# Patient Record
Sex: FEMALE | Race: WHITE | NOT HISPANIC OR LATINO
[De-identification: names, ages, dates, MRNs, and addresses within clinical notes are randomized per-mention and may not be internally consistent; named-entity substitution may affect disease eponyms.]

---

## 2017-01-04 ENCOUNTER — TRANSCRIPTION ENCOUNTER (OUTPATIENT)
Age: 57
End: 2017-01-04

## 2017-01-27 ENCOUNTER — OUTPATIENT (OUTPATIENT)
Dept: OUTPATIENT SERVICES | Facility: HOSPITAL | Age: 57
LOS: 1 days | Discharge: HOME | End: 2017-01-27
Payer: COMMERCIAL

## 2017-01-27 DIAGNOSIS — Z80.0 FAMILY HISTORY OF MALIGNANT NEOPLASM OF DIGESTIVE ORGANS: ICD-10-CM

## 2017-01-27 PROCEDURE — 93010 ELECTROCARDIOGRAM REPORT: CPT

## 2017-02-02 ENCOUNTER — OUTPATIENT (OUTPATIENT)
Dept: OUTPATIENT SERVICES | Facility: HOSPITAL | Age: 57
LOS: 1 days | Discharge: HOME | End: 2017-02-02

## 2017-02-02 VITALS
TEMPERATURE: 97 F | OXYGEN SATURATION: 100 % | HEART RATE: 57 BPM | HEIGHT: 66 IN | SYSTOLIC BLOOD PRESSURE: 101 MMHG | RESPIRATION RATE: 15 BRPM | WEIGHT: 127.43 LBS | DIASTOLIC BLOOD PRESSURE: 64 MMHG

## 2017-02-02 DIAGNOSIS — S62.309B UNSPECIFIED FRACTURE OF UNSPECIFIED METACARPAL BONE, INITIAL ENCOUNTER FOR OPEN FRACTURE: Chronic | ICD-10-CM

## 2017-02-02 DIAGNOSIS — Z78.9 OTHER SPECIFIED HEALTH STATUS: Chronic | ICD-10-CM

## 2017-02-02 DIAGNOSIS — Z98.890 OTHER SPECIFIED POSTPROCEDURAL STATES: Chronic | ICD-10-CM

## 2017-02-02 RX ORDER — SODIUM CHLORIDE 9 MG/ML
1000 INJECTION INTRAMUSCULAR; INTRAVENOUS; SUBCUTANEOUS
Qty: 0 | Refills: 0 | Status: DISCONTINUED | OUTPATIENT
Start: 2017-02-02 | End: 2017-02-17

## 2017-02-06 DIAGNOSIS — Z80.0 FAMILY HISTORY OF MALIGNANT NEOPLASM OF DIGESTIVE ORGANS: ICD-10-CM

## 2017-02-06 DIAGNOSIS — Z12.11 ENCOUNTER FOR SCREENING FOR MALIGNANT NEOPLASM OF COLON: ICD-10-CM

## 2017-02-06 DIAGNOSIS — K57.30 DIVERTICULOSIS OF LARGE INTESTINE WITHOUT PERFORATION OR ABSCESS WITHOUT BLEEDING: ICD-10-CM

## 2018-06-18 ENCOUNTER — MESSAGE (OUTPATIENT)
Age: 58
End: 2018-06-18

## 2018-06-25 ENCOUNTER — APPOINTMENT (OUTPATIENT)
Dept: ULTRASOUND IMAGING | Facility: CLINIC | Age: 58
End: 2018-06-25
Payer: COMMERCIAL

## 2018-06-25 ENCOUNTER — APPOINTMENT (OUTPATIENT)
Dept: MAMMOGRAPHY | Facility: CLINIC | Age: 58
End: 2018-06-25
Payer: COMMERCIAL

## 2018-06-25 ENCOUNTER — OUTPATIENT (OUTPATIENT)
Dept: OUTPATIENT SERVICES | Facility: HOSPITAL | Age: 58
LOS: 1 days | End: 2018-06-25
Payer: COMMERCIAL

## 2018-06-25 DIAGNOSIS — Z00.8 ENCOUNTER FOR OTHER GENERAL EXAMINATION: ICD-10-CM

## 2018-06-25 DIAGNOSIS — Z98.890 OTHER SPECIFIED POSTPROCEDURAL STATES: Chronic | ICD-10-CM

## 2018-06-25 DIAGNOSIS — S62.309B UNSPECIFIED FRACTURE OF UNSPECIFIED METACARPAL BONE, INITIAL ENCOUNTER FOR OPEN FRACTURE: Chronic | ICD-10-CM

## 2018-06-25 DIAGNOSIS — Z78.9 OTHER SPECIFIED HEALTH STATUS: Chronic | ICD-10-CM

## 2018-06-25 PROCEDURE — 77063 BREAST TOMOSYNTHESIS BI: CPT | Mod: 26

## 2018-06-25 PROCEDURE — 76641 ULTRASOUND BREAST COMPLETE: CPT | Mod: 26,50

## 2018-06-25 PROCEDURE — 77063 BREAST TOMOSYNTHESIS BI: CPT

## 2018-06-25 PROCEDURE — 77067 SCR MAMMO BI INCL CAD: CPT

## 2018-06-25 PROCEDURE — 77067 SCR MAMMO BI INCL CAD: CPT | Mod: 26

## 2018-06-25 PROCEDURE — 76641 ULTRASOUND BREAST COMPLETE: CPT

## 2018-11-06 ENCOUNTER — TRANSCRIPTION ENCOUNTER (OUTPATIENT)
Age: 58
End: 2018-11-06

## 2019-03-28 ENCOUNTER — OUTPATIENT (OUTPATIENT)
Dept: OUTPATIENT SERVICES | Facility: HOSPITAL | Age: 59
LOS: 1 days | Discharge: HOME | End: 2019-03-28

## 2019-03-28 DIAGNOSIS — R19.7 DIARRHEA, UNSPECIFIED: ICD-10-CM

## 2019-03-28 DIAGNOSIS — S62.309B UNSPECIFIED FRACTURE OF UNSPECIFIED METACARPAL BONE, INITIAL ENCOUNTER FOR OPEN FRACTURE: Chronic | ICD-10-CM

## 2019-03-28 DIAGNOSIS — Z78.9 OTHER SPECIFIED HEALTH STATUS: Chronic | ICD-10-CM

## 2019-03-28 DIAGNOSIS — Z98.890 OTHER SPECIFIED POSTPROCEDURAL STATES: Chronic | ICD-10-CM

## 2019-03-28 LAB
ALBUMIN SERPL ELPH-MCNC: 2.7 G/DL — LOW (ref 3.3–5)
ALP SERPL-CCNC: 59 U/L — SIGNIFICANT CHANGE UP (ref 40–120)
ALT FLD-CCNC: 15 U/L — SIGNIFICANT CHANGE UP (ref 12–78)
ANION GAP SERPL CALC-SCNC: 7 MMOL/L — SIGNIFICANT CHANGE UP (ref 5–17)
AST SERPL-CCNC: 12 U/L — LOW (ref 15–37)
BASOPHILS # BLD AUTO: 0.02 K/UL — SIGNIFICANT CHANGE UP (ref 0–0.2)
BASOPHILS NFR BLD AUTO: 0.6 % — SIGNIFICANT CHANGE UP (ref 0–2)
BILIRUB SERPL-MCNC: 0.4 MG/DL — SIGNIFICANT CHANGE UP (ref 0.2–1.2)
BUN SERPL-MCNC: 7 MG/DL — SIGNIFICANT CHANGE UP (ref 7–23)
CALCIUM SERPL-MCNC: 7.9 MG/DL — LOW (ref 8.5–10.1)
CHLORIDE SERPL-SCNC: 110 MMOL/L — HIGH (ref 96–108)
CO2 SERPL-SCNC: 25 MMOL/L — SIGNIFICANT CHANGE UP (ref 22–31)
CREAT SERPL-MCNC: 0.62 MG/DL — SIGNIFICANT CHANGE UP (ref 0.5–1.3)
EOSINOPHIL # BLD AUTO: 0.09 K/UL — SIGNIFICANT CHANGE UP (ref 0–0.5)
EOSINOPHIL NFR BLD AUTO: 2.5 % — SIGNIFICANT CHANGE UP (ref 0–6)
GLUCOSE SERPL-MCNC: 103 MG/DL — HIGH (ref 70–99)
HCT VFR BLD CALC: 38.4 % — SIGNIFICANT CHANGE UP (ref 34.5–45)
HGB BLD-MCNC: 12.9 G/DL — SIGNIFICANT CHANGE UP (ref 11.5–15.5)
IMM GRANULOCYTES NFR BLD AUTO: 0.3 % — SIGNIFICANT CHANGE UP (ref 0–1.5)
LYMPHOCYTES # BLD AUTO: 1.54 K/UL — SIGNIFICANT CHANGE UP (ref 1–3.3)
LYMPHOCYTES # BLD AUTO: 43.6 % — SIGNIFICANT CHANGE UP (ref 13–44)
MCHC RBC-ENTMCNC: 30.4 PG — SIGNIFICANT CHANGE UP (ref 27–34)
MCHC RBC-ENTMCNC: 33.6 GM/DL — SIGNIFICANT CHANGE UP (ref 32–36)
MCV RBC AUTO: 90.6 FL — SIGNIFICANT CHANGE UP (ref 80–100)
MONOCYTES # BLD AUTO: 0.56 K/UL — SIGNIFICANT CHANGE UP (ref 0–0.9)
MONOCYTES NFR BLD AUTO: 15.9 % — HIGH (ref 2–14)
NEUTROPHILS # BLD AUTO: 1.31 K/UL — LOW (ref 1.8–7.4)
NEUTROPHILS NFR BLD AUTO: 37.1 % — LOW (ref 43–77)
NRBC # BLD: 0 /100 WBCS — SIGNIFICANT CHANGE UP (ref 0–0)
PLATELET # BLD AUTO: 223 K/UL — SIGNIFICANT CHANGE UP (ref 150–400)
POTASSIUM SERPL-MCNC: 3.6 MMOL/L — SIGNIFICANT CHANGE UP (ref 3.5–5.3)
POTASSIUM SERPL-SCNC: 3.6 MMOL/L — SIGNIFICANT CHANGE UP (ref 3.5–5.3)
PROT SERPL-MCNC: 6.1 GM/DL — SIGNIFICANT CHANGE UP (ref 6–8.3)
RBC # BLD: 4.24 M/UL — SIGNIFICANT CHANGE UP (ref 3.8–5.2)
RBC # FLD: 12.8 % — SIGNIFICANT CHANGE UP (ref 10.3–14.5)
SODIUM SERPL-SCNC: 142 MMOL/L — SIGNIFICANT CHANGE UP (ref 135–145)
WBC # BLD: 3.53 K/UL — LOW (ref 3.8–10.5)
WBC # FLD AUTO: 3.53 K/UL — LOW (ref 3.8–10.5)

## 2019-03-29 PROBLEM — R76.11 NONSPECIFIC REACTION TO TUBERCULIN SKIN TEST WITHOUT ACTIVE TUBERCULOSIS: Chronic | Status: ACTIVE | Noted: 2017-02-02

## 2019-03-29 PROBLEM — N95.1 MENOPAUSAL AND FEMALE CLIMACTERIC STATES: Chronic | Status: ACTIVE | Noted: 2017-02-02

## 2019-03-29 PROBLEM — K63.5 POLYP OF COLON: Chronic | Status: ACTIVE | Noted: 2017-02-02

## 2019-03-29 LAB
CULTURE RESULTS: SIGNIFICANT CHANGE UP
SPECIMEN SOURCE: SIGNIFICANT CHANGE UP

## 2019-06-12 ENCOUNTER — RESULT REVIEW (OUTPATIENT)
Age: 59
End: 2019-06-12

## 2019-06-25 ENCOUNTER — OUTPATIENT (OUTPATIENT)
Dept: OUTPATIENT SERVICES | Facility: HOSPITAL | Age: 59
LOS: 1 days | End: 2019-06-25
Payer: COMMERCIAL

## 2019-06-25 ENCOUNTER — APPOINTMENT (OUTPATIENT)
Dept: MAMMOGRAPHY | Facility: CLINIC | Age: 59
End: 2019-06-25
Payer: COMMERCIAL

## 2019-06-25 DIAGNOSIS — Z98.890 OTHER SPECIFIED POSTPROCEDURAL STATES: Chronic | ICD-10-CM

## 2019-06-25 DIAGNOSIS — Z78.9 OTHER SPECIFIED HEALTH STATUS: Chronic | ICD-10-CM

## 2019-06-25 DIAGNOSIS — Z00.8 ENCOUNTER FOR OTHER GENERAL EXAMINATION: ICD-10-CM

## 2019-06-25 DIAGNOSIS — S62.309B UNSPECIFIED FRACTURE OF UNSPECIFIED METACARPAL BONE, INITIAL ENCOUNTER FOR OPEN FRACTURE: Chronic | ICD-10-CM

## 2019-06-25 PROCEDURE — 77067 SCR MAMMO BI INCL CAD: CPT

## 2019-06-25 PROCEDURE — 77063 BREAST TOMOSYNTHESIS BI: CPT

## 2019-06-25 PROCEDURE — 77063 BREAST TOMOSYNTHESIS BI: CPT | Mod: 26

## 2019-06-25 PROCEDURE — 77067 SCR MAMMO BI INCL CAD: CPT | Mod: 26

## 2020-04-25 ENCOUNTER — MESSAGE (OUTPATIENT)
Age: 60
End: 2020-04-25

## 2020-05-05 LAB
SARS-COV-2 IGG SERPL IA-ACNC: <0.1 INDEX
SARS-COV-2 IGG SERPL QL IA: NEGATIVE

## 2020-07-15 ENCOUNTER — RESULT REVIEW (OUTPATIENT)
Age: 60
End: 2020-07-15

## 2020-10-08 ENCOUNTER — OUTPATIENT (OUTPATIENT)
Dept: OUTPATIENT SERVICES | Facility: HOSPITAL | Age: 60
LOS: 1 days | End: 2020-10-08
Payer: COMMERCIAL

## 2020-10-08 ENCOUNTER — APPOINTMENT (OUTPATIENT)
Dept: MAMMOGRAPHY | Facility: CLINIC | Age: 60
End: 2020-10-08
Payer: COMMERCIAL

## 2020-10-08 DIAGNOSIS — S62.309B UNSPECIFIED FRACTURE OF UNSPECIFIED METACARPAL BONE, INITIAL ENCOUNTER FOR OPEN FRACTURE: Chronic | ICD-10-CM

## 2020-10-08 DIAGNOSIS — Z98.890 OTHER SPECIFIED POSTPROCEDURAL STATES: Chronic | ICD-10-CM

## 2020-10-08 DIAGNOSIS — Z78.9 OTHER SPECIFIED HEALTH STATUS: Chronic | ICD-10-CM

## 2020-10-08 DIAGNOSIS — Z00.8 ENCOUNTER FOR OTHER GENERAL EXAMINATION: ICD-10-CM

## 2020-10-08 PROCEDURE — 77063 BREAST TOMOSYNTHESIS BI: CPT | Mod: 26

## 2020-10-08 PROCEDURE — 77067 SCR MAMMO BI INCL CAD: CPT | Mod: 26

## 2020-10-08 PROCEDURE — 77063 BREAST TOMOSYNTHESIS BI: CPT

## 2020-10-08 PROCEDURE — 77067 SCR MAMMO BI INCL CAD: CPT

## 2020-11-01 ENCOUNTER — EMERGENCY (EMERGENCY)
Facility: HOSPITAL | Age: 60
LOS: 0 days | Discharge: HOME | End: 2020-11-01
Payer: COMMERCIAL

## 2020-11-01 VITALS
SYSTOLIC BLOOD PRESSURE: 135 MMHG | OXYGEN SATURATION: 99 % | RESPIRATION RATE: 16 BRPM | HEIGHT: 66 IN | DIASTOLIC BLOOD PRESSURE: 81 MMHG | TEMPERATURE: 98 F | HEART RATE: 81 BPM

## 2020-11-01 DIAGNOSIS — Z98.890 OTHER SPECIFIED POSTPROCEDURAL STATES: Chronic | ICD-10-CM

## 2020-11-01 DIAGNOSIS — Z78.9 OTHER SPECIFIED HEALTH STATUS: Chronic | ICD-10-CM

## 2020-11-01 DIAGNOSIS — Z20.828 CONTACT WITH AND (SUSPECTED) EXPOSURE TO OTHER VIRAL COMMUNICABLE DISEASES: ICD-10-CM

## 2020-11-01 DIAGNOSIS — S62.309B UNSPECIFIED FRACTURE OF UNSPECIFIED METACARPAL BONE, INITIAL ENCOUNTER FOR OPEN FRACTURE: Chronic | ICD-10-CM

## 2020-11-01 PROCEDURE — 99283 EMERGENCY DEPT VISIT LOW MDM: CPT

## 2020-11-01 PROCEDURE — U0003: CPT

## 2020-11-01 NOTE — ED STATDOCS - OBJECTIVE STATEMENT
59 y/o Female with no PMHX presents to the ED with concern for Covid 19 after travel to [place].  Pt denies symptoms including cough, fever, SOB, sore throat, runny nose, bodyaches, nausea, diarrhea, loss of smell. Pt here for testing.

## 2020-11-01 NOTE — ED STATDOCS - PATIENT PORTAL LINK FT
You can access the FollowMyHealth Patient Portal offered by [facility] by registering at the following website: [email or web address]. By joining [employer]’s FollowMyHealth portal, you will also be able to view your health information using other applications (apps) compatible with our system.

## 2020-11-02 LAB — SARS-COV-2 RNA SPEC QL NAA+PROBE: SIGNIFICANT CHANGE UP

## 2020-11-09 ENCOUNTER — TRANSCRIPTION ENCOUNTER (OUTPATIENT)
Age: 60
End: 2020-11-09

## 2020-11-09 ENCOUNTER — APPOINTMENT (OUTPATIENT)
Dept: OPHTHALMOLOGY | Facility: CLINIC | Age: 60
End: 2020-11-09
Payer: COMMERCIAL

## 2020-11-09 ENCOUNTER — NON-APPOINTMENT (OUTPATIENT)
Age: 60
End: 2020-11-09

## 2020-11-09 PROCEDURE — 92004 COMPRE OPH EXAM NEW PT 1/>: CPT

## 2020-11-09 PROCEDURE — 99072 ADDL SUPL MATRL&STAF TM PHE: CPT

## 2020-11-10 ENCOUNTER — APPOINTMENT (OUTPATIENT)
Dept: OPHTHALMOLOGY | Facility: CLINIC | Age: 60
End: 2020-11-10
Payer: COMMERCIAL

## 2020-11-10 ENCOUNTER — NON-APPOINTMENT (OUTPATIENT)
Age: 60
End: 2020-11-10

## 2020-11-10 PROCEDURE — 99072 ADDL SUPL MATRL&STAF TM PHE: CPT

## 2020-11-10 PROCEDURE — 92012 INTRM OPH EXAM EST PATIENT: CPT

## 2020-11-12 ENCOUNTER — APPOINTMENT (OUTPATIENT)
Dept: OPHTHALMOLOGY | Facility: CLINIC | Age: 60
End: 2020-11-12
Payer: COMMERCIAL

## 2020-11-12 ENCOUNTER — NON-APPOINTMENT (OUTPATIENT)
Age: 60
End: 2020-11-12

## 2020-11-12 PROCEDURE — 92012 INTRM OPH EXAM EST PATIENT: CPT

## 2020-11-12 PROCEDURE — 99072 ADDL SUPL MATRL&STAF TM PHE: CPT

## 2020-11-17 ENCOUNTER — APPOINTMENT (OUTPATIENT)
Dept: OPHTHALMOLOGY | Facility: CLINIC | Age: 60
End: 2020-11-17
Payer: COMMERCIAL

## 2020-11-17 ENCOUNTER — NON-APPOINTMENT (OUTPATIENT)
Age: 60
End: 2020-11-17

## 2020-11-17 PROCEDURE — 92012 INTRM OPH EXAM EST PATIENT: CPT

## 2020-11-17 PROCEDURE — 99072 ADDL SUPL MATRL&STAF TM PHE: CPT

## 2020-11-19 ENCOUNTER — OUTPATIENT (OUTPATIENT)
Dept: OUTPATIENT SERVICES | Facility: HOSPITAL | Age: 60
LOS: 1 days | End: 2020-11-19
Payer: COMMERCIAL

## 2020-11-19 DIAGNOSIS — S62.309B UNSPECIFIED FRACTURE OF UNSPECIFIED METACARPAL BONE, INITIAL ENCOUNTER FOR OPEN FRACTURE: Chronic | ICD-10-CM

## 2020-11-19 DIAGNOSIS — Z78.9 OTHER SPECIFIED HEALTH STATUS: Chronic | ICD-10-CM

## 2020-11-19 DIAGNOSIS — Z98.890 OTHER SPECIFIED POSTPROCEDURAL STATES: Chronic | ICD-10-CM

## 2020-11-19 DIAGNOSIS — Z11.59 ENCOUNTER FOR SCREENING FOR OTHER VIRAL DISEASES: ICD-10-CM

## 2020-11-19 LAB — SARS-COV-2 RNA SPEC QL NAA+PROBE: SIGNIFICANT CHANGE UP

## 2020-11-19 PROCEDURE — U0003: CPT

## 2020-11-20 DIAGNOSIS — Z11.59 ENCOUNTER FOR SCREENING FOR OTHER VIRAL DISEASES: ICD-10-CM

## 2021-01-22 DIAGNOSIS — Z01.818 ENCOUNTER FOR OTHER PREPROCEDURAL EXAMINATION: ICD-10-CM

## 2021-01-25 ENCOUNTER — APPOINTMENT (OUTPATIENT)
Dept: DISASTER EMERGENCY | Facility: CLINIC | Age: 61
End: 2021-01-25

## 2021-01-26 LAB — SARS-COV-2 N GENE NPH QL NAA+PROBE: NOT DETECTED

## 2021-01-28 ENCOUNTER — OUTPATIENT (OUTPATIENT)
Dept: OUTPATIENT SERVICES | Facility: HOSPITAL | Age: 61
LOS: 1 days | Discharge: HOME | End: 2021-01-28

## 2021-01-28 VITALS
HEART RATE: 57 BPM | TEMPERATURE: 98 F | SYSTOLIC BLOOD PRESSURE: 115 MMHG | OXYGEN SATURATION: 99 % | WEIGHT: 119.93 LBS | HEIGHT: 65 IN | RESPIRATION RATE: 23 BRPM | DIASTOLIC BLOOD PRESSURE: 55 MMHG

## 2021-01-28 DIAGNOSIS — Z78.9 OTHER SPECIFIED HEALTH STATUS: Chronic | ICD-10-CM

## 2021-01-28 DIAGNOSIS — Z86.010 PERSONAL HISTORY OF COLONIC POLYPS: ICD-10-CM

## 2021-01-28 DIAGNOSIS — S62.309B UNSPECIFIED FRACTURE OF UNSPECIFIED METACARPAL BONE, INITIAL ENCOUNTER FOR OPEN FRACTURE: Chronic | ICD-10-CM

## 2021-01-28 DIAGNOSIS — Z98.890 OTHER SPECIFIED POSTPROCEDURAL STATES: Chronic | ICD-10-CM

## 2021-01-28 RX ORDER — PROGESTERONE 200 MG/1
1 CAPSULE, LIQUID FILLED ORAL
Qty: 0 | Refills: 0 | DISCHARGE

## 2021-01-28 RX ORDER — ESTRADIOL 4 UG/1
1 INSERT VAGINAL
Qty: 0 | Refills: 0 | DISCHARGE

## 2021-02-04 DIAGNOSIS — Z12.11 ENCOUNTER FOR SCREENING FOR MALIGNANT NEOPLASM OF COLON: ICD-10-CM

## 2021-02-04 DIAGNOSIS — Z80.0 FAMILY HISTORY OF MALIGNANT NEOPLASM OF DIGESTIVE ORGANS: ICD-10-CM

## 2021-02-04 DIAGNOSIS — K64.8 OTHER HEMORRHOIDS: ICD-10-CM

## 2021-10-20 ENCOUNTER — RESULT REVIEW (OUTPATIENT)
Age: 61
End: 2021-10-20

## 2021-10-27 ENCOUNTER — NON-APPOINTMENT (OUTPATIENT)
Age: 61
End: 2021-10-27

## 2021-11-04 ENCOUNTER — RESULT REVIEW (OUTPATIENT)
Age: 61
End: 2021-11-04

## 2021-11-04 ENCOUNTER — APPOINTMENT (OUTPATIENT)
Dept: ULTRASOUND IMAGING | Facility: CLINIC | Age: 61
End: 2021-11-04

## 2021-11-04 ENCOUNTER — OUTPATIENT (OUTPATIENT)
Dept: OUTPATIENT SERVICES | Facility: HOSPITAL | Age: 61
LOS: 1 days | End: 2021-11-04
Payer: COMMERCIAL

## 2021-11-04 ENCOUNTER — APPOINTMENT (OUTPATIENT)
Dept: MAMMOGRAPHY | Facility: CLINIC | Age: 61
End: 2021-11-04
Payer: COMMERCIAL

## 2021-11-04 DIAGNOSIS — Z98.890 OTHER SPECIFIED POSTPROCEDURAL STATES: Chronic | ICD-10-CM

## 2021-11-04 DIAGNOSIS — Z00.8 ENCOUNTER FOR OTHER GENERAL EXAMINATION: ICD-10-CM

## 2021-11-04 DIAGNOSIS — S62.309B UNSPECIFIED FRACTURE OF UNSPECIFIED METACARPAL BONE, INITIAL ENCOUNTER FOR OPEN FRACTURE: Chronic | ICD-10-CM

## 2021-11-04 DIAGNOSIS — Z78.9 OTHER SPECIFIED HEALTH STATUS: Chronic | ICD-10-CM

## 2021-11-04 PROCEDURE — 77063 BREAST TOMOSYNTHESIS BI: CPT

## 2021-11-04 PROCEDURE — 76641 ULTRASOUND BREAST COMPLETE: CPT | Mod: 26,50

## 2021-11-04 PROCEDURE — 76641 ULTRASOUND BREAST COMPLETE: CPT

## 2021-11-04 PROCEDURE — 77063 BREAST TOMOSYNTHESIS BI: CPT | Mod: 26

## 2021-11-04 PROCEDURE — 77067 SCR MAMMO BI INCL CAD: CPT

## 2021-11-04 PROCEDURE — 77067 SCR MAMMO BI INCL CAD: CPT | Mod: 26

## 2021-11-05 ENCOUNTER — NON-APPOINTMENT (OUTPATIENT)
Age: 61
End: 2021-11-05

## 2021-11-22 ENCOUNTER — OUTPATIENT (OUTPATIENT)
Dept: OUTPATIENT SERVICES | Facility: HOSPITAL | Age: 61
LOS: 1 days | End: 2021-11-22
Payer: COMMERCIAL

## 2021-11-22 DIAGNOSIS — Z98.890 OTHER SPECIFIED POSTPROCEDURAL STATES: Chronic | ICD-10-CM

## 2021-11-22 DIAGNOSIS — S62.309B UNSPECIFIED FRACTURE OF UNSPECIFIED METACARPAL BONE, INITIAL ENCOUNTER FOR OPEN FRACTURE: Chronic | ICD-10-CM

## 2021-11-22 DIAGNOSIS — Z20.828 CONTACT WITH AND (SUSPECTED) EXPOSURE TO OTHER VIRAL COMMUNICABLE DISEASES: ICD-10-CM

## 2021-11-22 DIAGNOSIS — Z78.9 OTHER SPECIFIED HEALTH STATUS: Chronic | ICD-10-CM

## 2021-11-22 LAB — SARS-COV-2 RNA SPEC QL NAA+PROBE: SIGNIFICANT CHANGE UP

## 2021-11-22 PROCEDURE — U0005: CPT

## 2021-11-22 PROCEDURE — U0003: CPT

## 2021-11-23 DIAGNOSIS — Z20.828 CONTACT WITH AND (SUSPECTED) EXPOSURE TO OTHER VIRAL COMMUNICABLE DISEASES: ICD-10-CM

## 2022-01-02 ENCOUNTER — OUTPATIENT (OUTPATIENT)
Dept: OUTPATIENT SERVICES | Facility: HOSPITAL | Age: 62
LOS: 1 days | End: 2022-01-02
Payer: COMMERCIAL

## 2022-01-02 DIAGNOSIS — Z98.890 OTHER SPECIFIED POSTPROCEDURAL STATES: Chronic | ICD-10-CM

## 2022-01-02 DIAGNOSIS — Z78.9 OTHER SPECIFIED HEALTH STATUS: Chronic | ICD-10-CM

## 2022-01-02 DIAGNOSIS — S62.309B UNSPECIFIED FRACTURE OF UNSPECIFIED METACARPAL BONE, INITIAL ENCOUNTER FOR OPEN FRACTURE: Chronic | ICD-10-CM

## 2022-01-02 DIAGNOSIS — Z20.828 CONTACT WITH AND (SUSPECTED) EXPOSURE TO OTHER VIRAL COMMUNICABLE DISEASES: ICD-10-CM

## 2022-01-02 LAB — SARS-COV-2 RNA SPEC QL NAA+PROBE: DETECTED

## 2022-01-02 PROCEDURE — C9803: CPT

## 2022-01-02 PROCEDURE — U0005: CPT

## 2022-01-02 PROCEDURE — U0003: CPT

## 2022-01-03 DIAGNOSIS — Z20.828 CONTACT WITH AND (SUSPECTED) EXPOSURE TO OTHER VIRAL COMMUNICABLE DISEASES: ICD-10-CM

## 2022-04-04 ENCOUNTER — APPOINTMENT (OUTPATIENT)
Dept: PSYCHIATRY | Facility: CLINIC | Age: 62
End: 2022-04-04
Payer: COMMERCIAL

## 2022-04-04 PROCEDURE — 90791 PSYCH DIAGNOSTIC EVALUATION: CPT | Mod: 95

## 2022-04-11 ENCOUNTER — APPOINTMENT (OUTPATIENT)
Dept: PSYCHIATRY | Facility: CLINIC | Age: 62
End: 2022-04-11
Payer: COMMERCIAL

## 2022-04-11 PROCEDURE — 90834 PSYTX W PT 45 MINUTES: CPT | Mod: 95

## 2022-04-19 ENCOUNTER — APPOINTMENT (OUTPATIENT)
Dept: PSYCHIATRY | Facility: CLINIC | Age: 62
End: 2022-04-19
Payer: COMMERCIAL

## 2022-04-19 PROCEDURE — 90837 PSYTX W PT 60 MINUTES: CPT | Mod: 95

## 2022-04-28 ENCOUNTER — APPOINTMENT (OUTPATIENT)
Dept: PSYCHIATRY | Facility: CLINIC | Age: 62
End: 2022-04-28
Payer: COMMERCIAL

## 2022-04-28 PROCEDURE — 90837 PSYTX W PT 60 MINUTES: CPT | Mod: 95

## 2022-05-05 ENCOUNTER — APPOINTMENT (OUTPATIENT)
Dept: PSYCHIATRY | Facility: CLINIC | Age: 62
End: 2022-05-05
Payer: COMMERCIAL

## 2022-05-05 PROCEDURE — 90837 PSYTX W PT 60 MINUTES: CPT | Mod: 95

## 2022-05-12 ENCOUNTER — APPOINTMENT (OUTPATIENT)
Dept: PSYCHIATRY | Facility: CLINIC | Age: 62
End: 2022-05-12
Payer: COMMERCIAL

## 2022-05-12 PROCEDURE — 90837 PSYTX W PT 60 MINUTES: CPT | Mod: 95

## 2022-05-19 ENCOUNTER — APPOINTMENT (OUTPATIENT)
Dept: PSYCHIATRY | Facility: CLINIC | Age: 62
End: 2022-05-19
Payer: COMMERCIAL

## 2022-05-19 PROCEDURE — 90837 PSYTX W PT 60 MINUTES: CPT | Mod: 95

## 2022-05-27 ENCOUNTER — APPOINTMENT (OUTPATIENT)
Dept: PSYCHIATRY | Facility: CLINIC | Age: 62
End: 2022-05-27
Payer: COMMERCIAL

## 2022-05-27 PROCEDURE — 90834 PSYTX W PT 45 MINUTES: CPT | Mod: 95

## 2022-06-03 ENCOUNTER — APPOINTMENT (OUTPATIENT)
Dept: PSYCHIATRY | Facility: CLINIC | Age: 62
End: 2022-06-03
Payer: COMMERCIAL

## 2022-06-03 PROCEDURE — 90837 PSYTX W PT 60 MINUTES: CPT | Mod: 95

## 2022-06-09 ENCOUNTER — NON-APPOINTMENT (OUTPATIENT)
Age: 62
End: 2022-06-09

## 2022-06-09 ENCOUNTER — APPOINTMENT (OUTPATIENT)
Dept: PSYCHIATRY | Facility: CLINIC | Age: 62
End: 2022-06-09

## 2022-06-23 ENCOUNTER — APPOINTMENT (OUTPATIENT)
Dept: PSYCHIATRY | Facility: CLINIC | Age: 62
End: 2022-06-23

## 2022-06-30 ENCOUNTER — APPOINTMENT (OUTPATIENT)
Dept: PSYCHIATRY | Facility: CLINIC | Age: 62
End: 2022-06-30

## 2022-06-30 PROCEDURE — 90834 PSYTX W PT 45 MINUTES: CPT | Mod: 95

## 2022-07-07 ENCOUNTER — APPOINTMENT (OUTPATIENT)
Dept: PSYCHIATRY | Facility: CLINIC | Age: 62
End: 2022-07-07

## 2022-07-07 PROCEDURE — 90834 PSYTX W PT 45 MINUTES: CPT | Mod: 95

## 2022-07-21 ENCOUNTER — APPOINTMENT (OUTPATIENT)
Dept: PSYCHIATRY | Facility: CLINIC | Age: 62
End: 2022-07-21

## 2022-07-21 PROCEDURE — 90834 PSYTX W PT 45 MINUTES: CPT | Mod: 95

## 2022-07-29 ENCOUNTER — APPOINTMENT (OUTPATIENT)
Dept: PSYCHIATRY | Facility: CLINIC | Age: 62
End: 2022-07-29

## 2022-07-29 PROCEDURE — 90837 PSYTX W PT 60 MINUTES: CPT | Mod: 95

## 2022-08-11 ENCOUNTER — APPOINTMENT (OUTPATIENT)
Dept: PSYCHIATRY | Facility: CLINIC | Age: 62
End: 2022-08-11

## 2022-08-11 PROCEDURE — 90837 PSYTX W PT 60 MINUTES: CPT | Mod: 95

## 2022-08-25 ENCOUNTER — APPOINTMENT (OUTPATIENT)
Dept: PSYCHIATRY | Facility: CLINIC | Age: 62
End: 2022-08-25

## 2022-08-29 ENCOUNTER — APPOINTMENT (OUTPATIENT)
Dept: PSYCHIATRY | Facility: CLINIC | Age: 62
End: 2022-08-29

## 2022-08-29 PROCEDURE — 90837 PSYTX W PT 60 MINUTES: CPT | Mod: 95

## 2022-09-12 ENCOUNTER — APPOINTMENT (OUTPATIENT)
Dept: PSYCHIATRY | Facility: CLINIC | Age: 62
End: 2022-09-12

## 2022-09-12 PROCEDURE — 90834 PSYTX W PT 45 MINUTES: CPT | Mod: 95

## 2022-09-26 ENCOUNTER — APPOINTMENT (OUTPATIENT)
Dept: PSYCHIATRY | Facility: CLINIC | Age: 62
End: 2022-09-26

## 2022-09-26 PROCEDURE — 90832 PSYTX W PT 30 MINUTES: CPT | Mod: 95

## 2022-10-07 ENCOUNTER — APPOINTMENT (OUTPATIENT)
Dept: PSYCHIATRY | Facility: CLINIC | Age: 62
End: 2022-10-07

## 2022-10-07 PROCEDURE — 90834 PSYTX W PT 45 MINUTES: CPT | Mod: 95

## 2022-10-21 ENCOUNTER — APPOINTMENT (OUTPATIENT)
Dept: PSYCHIATRY | Facility: CLINIC | Age: 62
End: 2022-10-21

## 2022-10-21 PROCEDURE — 90837 PSYTX W PT 60 MINUTES: CPT | Mod: 95

## 2022-10-25 NOTE — REASON FOR VISIT
[Home] : at home, [unfilled] , at the time of the visit. [Other Location: e.g. Home (Enter Location, City,State)___] : at [unfilled] [Patient] : the patient [Self] : self [FreeTextEntry1] : follow-up

## 2022-10-25 NOTE — PHYSICAL EXAM
[Average] : average [Cooperative] : cooperative [Anxious] : anxious [Full] : full [Clear] : clear [Linear/Goal Directed] : linear/goal directed [Above average] : above average [WNL] : within normal limits

## 2022-10-25 NOTE — PLAN
[Trauma Focused CBT] : Trauma Focused CBT [Recommended Frequency of Visits: ____] : Recommended frequency of visits: [unfilled] [Return in ____ week(s)] : Return in [unfilled] week(s) [FreeTextEntry2] : 1) Demonstrate understanding of relationship between trauma history and thoughts and beliefs contributing to anxiety symptoms\par \par 2) Engage in cognitive restructuring of beliefs and emotional processing and meaning-making around history of trauma.  [de-identified] : Tami returns for a follow-up. She reported on a panic attack that she experienced after she had scheduled a work meeting with a male colleague and began to have anticipatory fears about this being the first time that they would be meeting without others present. Guided exploration about underlying scenarios she was envisioning in which he would act inappropriately, and discussed how these relate to prior experiences she had as a child and adolescent. She reported that she has been having more vivid recollections of the experience that occurred when she was 4-5, and we discussed how the anxiety she has been feeling both in the work she has been doing for her company as well as toward the colleagues she has been engaging with may be due to thematic or sensory similarities to how she felt when she was experiencing these earlier traumatic incidents. Discussed how to frame the anxiety she experiences in present-day scenarios in terms of fears she has that she will be traumatized again, and guided illustration about how to remind herself when these interactions occur that this outcome did not occur (e.g., distinguishing the past from the present). Concluded session by introducing and practicing a breath retraining exercise and reviewed rationale for daily practice for 5-10 minutes at times when she is already feeling relaxed. [FreeTextEntry1] : Continue bi-weekly individual psychotherapy sessions focused on trauma-informed CBT

## 2022-11-01 ENCOUNTER — APPOINTMENT (OUTPATIENT)
Dept: PSYCHIATRY | Facility: CLINIC | Age: 62
End: 2022-11-01

## 2022-11-01 PROCEDURE — 90834 PSYTX W PT 45 MINUTES: CPT | Mod: 95

## 2022-11-03 NOTE — PLAN
[Trauma Focused CBT] : Trauma Focused CBT [Recommended Frequency of Visits: ____] : Recommended frequency of visits: [unfilled] [Return in ____ week(s)] : Return in [unfilled] week(s) [FreeTextEntry2] : 1) Demonstrate understanding of relationship between trauma history and thoughts and beliefs contributing to anxiety symptoms\par \par 2) Engage in cognitive restructuring of beliefs and emotional processing and meaning-making around history of trauma.  [de-identified] : Tami returns for a follow-up. She reported on improvements to anxiety symptoms over the past two weeks. She has been engaging in self-monitoring of specific precipitants for moments when she feels anxious and reflected on how her process of attending specifically to catastrophizing thoughts (e.g., asking herself what feared scenario she is imagining) has facilitated both recognition of the origins of some of these fears from prior experiences as well as an enhanced ability to re-frame these thoughts. Discussed this in terms of working toward distinguishing the past from the present in her mind in ways that aim to facilitate growth after traumatic experiences. She has decided to abstain from alcohol as well, noting that she typically drinks 1-2 glasses socially but has noticed that at times this leads to increased anxiety. [FreeTextEntry1] : Continue bi-weekly individual psychotherapy sessions focused on trauma-informed CBT

## 2022-11-14 ENCOUNTER — APPOINTMENT (OUTPATIENT)
Dept: PSYCHIATRY | Facility: CLINIC | Age: 62
End: 2022-11-14

## 2022-11-14 PROCEDURE — 90837 PSYTX W PT 60 MINUTES: CPT | Mod: 95

## 2022-11-15 NOTE — PLAN
[Trauma Focused CBT] : Trauma Focused CBT [Recommended Frequency of Visits: ____] : Recommended frequency of visits: [unfilled] [Return in ____ week(s)] : Return in [unfilled] week(s) [FreeTextEntry2] : 1) Demonstrate understanding of relationship between trauma history and thoughts and beliefs contributing to anxiety symptoms\par \par 2) Engage in cognitive restructuring of beliefs and emotional processing and meaning-making around history of trauma.  [de-identified] : Tami returns for a follow-up. Began session by informing Tami that I will be leaving [facility] in January 2023 and discussing treatment planning. She has been observing benefit from the trauma-focused CBT sessions and thinks she may be in a place to wrap up treatment in the next 1-2 months but would like to continue assessing her progress over the next several sessions, at which time we will formulate a concrete plan.\par \par She reported on having noticed an increase in anxiety in the days following two meetings she had with advisors and her  for their company during which she was either interrupted by them or felt invalidated in regard to her opinions and thought processes. She attended a concert a few days later and began to experience panic symptoms and discussed how self-monitoring what may have precipitated these symptoms was helpful in drawing connections between the panic and the work interactions she had earlier in the week. We explored how the anxiety she felt may have been occurring alongside feelings of anger toward her colleague, and how it may not have felt safe to recognize the anger due to dynamics that have activated reminders of earlier traumatic experiences. She discussed how getting in touch with this feeling fostered awareness of how she could address some of the dynamics that have been frustrating her with appropriate assertiveness. Discussed how this is an example of effectively distinguishing trauma-related reminders from present-day circumstances to foster corrective emotional experiences. [FreeTextEntry1] : Continue bi-weekly individual psychotherapy sessions focused on trauma-informed CBT

## 2022-11-22 ENCOUNTER — APPOINTMENT (OUTPATIENT)
Dept: PSYCHIATRY | Facility: CLINIC | Age: 62
End: 2022-11-22

## 2022-11-22 PROCEDURE — 90837 PSYTX W PT 60 MINUTES: CPT | Mod: 95

## 2022-11-25 NOTE — PLAN
[Trauma Focused CBT] : Trauma Focused CBT [Recommended Frequency of Visits: ____] : Recommended frequency of visits: [unfilled] [Return in ____ week(s)] : Return in [unfilled] week(s) [FreeTextEntry2] : 1) Demonstrate understanding of relationship between trauma history and thoughts and beliefs contributing to anxiety symptoms\par \par 2) Engage in cognitive restructuring of beliefs and emotional processing and meaning-making around history of trauma.  [de-identified] : Tami returns for a follow-up. 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 She observed that he responded to this by becoming more guarded, and also suggested a proposed solution that she thinks will be helpful overall for the work she and her  have been doing to grow the company. Processed her feelings of guilt arising from concerns that he felt threatened by their discussion alongside appreciation that he did not respond to her disclosure in the invalidating way that others have in the past. [FreeTextEntry1] : Continue bi-weekly individual psychotherapy sessions focused on trauma-informed CBT

## 2022-11-29 ENCOUNTER — APPOINTMENT (OUTPATIENT)
Dept: PSYCHIATRY | Facility: CLINIC | Age: 62
End: 2022-11-29

## 2022-11-29 PROCEDURE — 90837 PSYTX W PT 60 MINUTES: CPT | Mod: 95

## 2022-11-29 NOTE — PLAN
[Trauma Focused CBT] : Trauma Focused CBT [Recommended Frequency of Visits: ____] : Recommended frequency of visits: [unfilled] [Return in ____ week(s)] : Return in [unfilled] week(s) [FreeTextEntry2] : 1) Demonstrate understanding of relationship between trauma history and thoughts and beliefs contributing to anxiety symptoms\par \par 2) Engage in cognitive restructuring of beliefs and emotional processing and meaning-making around history of trauma.  [de-identified] : Tami returns for a follow-up. She reported on how she has continued to process the meeting she had with her company advisor earlier this month during which she discussed the dynamic of their interactions. She reflected on how discussing this openly with him felt like an important step in the work she has been doing on processing her history of traumatic stressors, and noticed the positive impact that this had on her sense of agency that has also led to reduced experiences of anxiety. She has been contemplating whether to follow up with her advisor about their conversation as she noticed that he became more guarded and boundaried after they spoke. Processed this in terms of her goals for their working relationship as well as her investment in his well-being. [FreeTextEntry1] : Continue weekly individual psychotherapy sessions focused on trauma-informed CBT

## 2022-12-06 ENCOUNTER — APPOINTMENT (OUTPATIENT)
Dept: PSYCHIATRY | Facility: CLINIC | Age: 62
End: 2022-12-06

## 2022-12-06 PROCEDURE — 90834 PSYTX W PT 45 MINUTES: CPT | Mod: 95

## 2022-12-07 NOTE — PLAN
[Trauma Focused CBT] : Trauma Focused CBT [Recommended Frequency of Visits: ____] : Recommended frequency of visits: [unfilled] [Return in ____ week(s)] : Return in [unfilled] week(s) [FreeTextEntry2] : 1) Demonstrate understanding of relationship between trauma history and thoughts and beliefs contributing to anxiety symptoms\par \par 2) Engage in cognitive restructuring of beliefs and emotional processing and meaning-making around history of trauma.  [de-identified] : Tami returns for a follow-up. She reported on parallels she has been noticing between the consultant she has been working with and the dynamic that she felt in her relationship with her father. Explored her thoughts about having longed for validation of her competence from her father and how this may have led to interpersonal expectations of others who resemble him in some ways as the consultant she works with does. Discussed ways to acknowledge this for herself when she is interacting with him.\par \par Continued discussion about treatment planning when the present episode of care ends in January 2023 due to writer's departure from [facility]. She would like to continue working with a therapist, and we discussed starting with a list of community referrals who accept her insurance due to present wait times at some of the [facility] practices. Advised that I would send a referral list via email. [FreeTextEntry1] : Continue weekly individual psychotherapy sessions focused on trauma-informed CBT

## 2022-12-07 NOTE — REASON FOR VISIT
[Home] : at home, [unfilled] , at the time of the visit. [Medical Office: ([facility])___] : at the medical office located in  [Patient] : the patient [Self] : self [FreeTextEntry1] : follow-up

## 2022-12-13 ENCOUNTER — APPOINTMENT (OUTPATIENT)
Dept: PSYCHIATRY | Facility: CLINIC | Age: 62
End: 2022-12-13

## 2022-12-13 PROCEDURE — 90837 PSYTX W PT 60 MINUTES: CPT | Mod: 95

## 2022-12-14 NOTE — PLAN
[Trauma Focused CBT] : Trauma Focused CBT [Recommended Frequency of Visits: ____] : Recommended frequency of visits: [unfilled] [Return in ____ week(s)] : Return in [unfilled] week(s) [FreeTextEntry2] : 1) Demonstrate understanding of relationship between trauma history and thoughts and beliefs contributing to anxiety symptoms\par \par 2) Engage in cognitive restructuring of beliefs and emotional processing and meaning-making around history of trauma.  [de-identified] : Tami returns for a follow-up. She reported that she has been considering augmenting her individual therapy with couples' work with her . 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 Provided contact information for the [facility] as they are in-network and offer couples' services and she plans to outreach to them. Discussed her observations of how she gustavo with the anxiety she feels during moments of conflict by engaging in the "georges" response and how this relates to prior traumatic experiences. Discussed frustration she feels after this as it reminds her of lost agency, and introduced principles of engaging interpersonal effectiveness and speaking to her  about taking a "time out" when they observe that their interactions around work are becoming escalated. [FreeTextEntry1] : Continue weekly individual psychotherapy sessions focused on trauma-informed CBT. Writer will be departing [facility] in January 2023 at which time patient plans to initiate care in the community or through [facility] pending availability and wait times. She will be looking into the following community referrals:\par \par [facility]\par [phone]\par [email or web address]\par [email or web address] \par [email or web address]\par [phone]\par [email or web address] \par [facility]\par [phone]\par [email or web address]

## 2022-12-14 NOTE — REASON FOR VISIT
[Home] : at home, [unfilled] , at the time of the visit. [Medical Office: ([facility])___] : at the medical office located in  [Patient] : the patient negative... [Self] : self [FreeTextEntry1] : follow-up

## 2022-12-19 ENCOUNTER — NON-APPOINTMENT (OUTPATIENT)
Age: 62
End: 2022-12-19

## 2022-12-20 ENCOUNTER — APPOINTMENT (OUTPATIENT)
Dept: PSYCHIATRY | Facility: CLINIC | Age: 62
End: 2022-12-20

## 2022-12-30 ENCOUNTER — APPOINTMENT (OUTPATIENT)
Dept: PSYCHIATRY | Facility: CLINIC | Age: 62
End: 2022-12-30
Payer: COMMERCIAL

## 2022-12-30 PROCEDURE — 90837 PSYTX W PT 60 MINUTES: CPT | Mod: 95

## 2023-01-04 NOTE — PLAN
[Trauma Focused CBT] : Trauma Focused CBT [Recommended Frequency of Visits: ____] : Recommended frequency of visits: [unfilled] [Return in ____ week(s)] : Return in [unfilled] week(s) [FreeTextEntry2] : 1) Demonstrate understanding of relationship between trauma history and thoughts and beliefs contributing to anxiety symptoms\par \par 2) Engage in cognitive restructuring of beliefs and emotional processing and meaning-making around history of trauma.  [de-identified] : Tami returns for a follow-up. Reviewed plan due to writer's departure from [facility] that we would meet for a termination and wrap-up session for the present episode of care in mid-January when she has returned from work travel. She plans to contact the community referrals to inquire about initiating care in the next few weeks. 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 She reflected on a sense of underlying concerns she was having that she would experience her audience in these presentations in ways that mirrored prior traumatic experiences. Explored how beliefs regarding agency and esteem that were influenced by these traumatic experiences have been brought up as she prepares for these meetings and how she feels able to acknowledge and respond to the emergence of these beliefs. [FreeTextEntry1] : We will meet for a termination and wrap-up session in January 2023 due to writer's departure from [facility]. Patient plans to initiate care in the community or through [facility] pending availability and wait times

## 2023-01-20 ENCOUNTER — APPOINTMENT (OUTPATIENT)
Dept: PSYCHIATRY | Facility: CLINIC | Age: 63
End: 2023-01-20
Payer: COMMERCIAL

## 2023-01-20 PROCEDURE — 90837 PSYTX W PT 60 MINUTES: CPT | Mod: 95

## 2023-01-21 NOTE — REASON FOR VISIT
[Patient preference] : as per patient preference [Continuing, patient not seen in-person within last 12 months (provide details below)] : Telehealth services are continuing, patient not seen in-person within last 12 months.  [Telehealth (audio & video) - Individual/Group] : This visit was provided via telehealth using real-time 2-way audio visual technology. [Home] : at home, [unfilled] , at the time of the visit. [Other Location: e.g. Home (Enter Location, City,State)___] : at [unfilled] [Patient] : the patient [Self] : self [FreeTextEntry4] : 0117 [facility] : 2246 [FreeTextEntry1] : termination session
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
## 2023-01-21 NOTE — PHYSICAL EXAM
[Average] : average [Cooperative] : cooperative [Anxious] : anxious [Full] : full [Linear/Goal Directed] : linear/goal directed [Above average] : above average [WNL] : within normal limits [Rapid] : rapid

## 2023-01-27 ENCOUNTER — APPOINTMENT (OUTPATIENT)
Dept: PSYCHIATRY | Facility: CLINIC | Age: 63
End: 2023-01-27
Payer: COMMERCIAL

## 2023-01-27 PROCEDURE — 90837 PSYTX W PT 60 MINUTES: CPT | Mod: 95

## 2023-01-27 NOTE — REASON FOR VISIT
[Patient preference] : as per patient preference [Other Location: e.g. Home (Enter Location, City,State)___] : The provider was located at [unfilled]. [Home] : The patient, [unfilled], was located at home, [unfilled], at the time of the visit. [Verbal consent obtained from patient/other participant(s)] : Verbal consent for telehealth/telephonic services obtained from patient/other participant(s) [Patient] : Patient [FreeTextEntry4] : 11:05 [FreeTextEntry5] : 12:00 [FreeTextEntry1] : continue with psychotherapy

## 2023-01-27 NOTE — PLAN
[Trauma Focused CBT] : Trauma Focused CBT [Recommended Frequency of Visits: ____] : Recommended frequency of visits: [unfilled] [Return in ____ week(s)] : Return in [unfilled] week(s) [FreeTextEntry2] : 1) Reduction in severity of symptoms demonstrated by 20% decline on self-report measures\par \par 2) Demonstrate understanding of relationship between trauma history and thoughts and beliefs contributing to anxiety symptoms\par \par 3) Engage in cognitive restructuring of beliefs and emotional processing and meaning-making around history of trauma.  [de-identified] : Session focused on transfer to writer, previous treatment and background on present issues. Client discussed background for treatment goals and benefits from previous treatment. Client appeared to gain much insight and ability to cope with PTSD symptoms associated with past traumatic experiences and current stressors that remind her of her past trauma. Positive feedback and validation were provided along with psychoeducation about the impact of trauma and trauma treatment.  Writer and client discussed interested in continued treatment and will review goals in next session to assist with focus on trauma treatment with this writer.  [FreeTextEntry1] : Continue with treatment weekly and review treatment plan at next session.

## 2023-02-02 ENCOUNTER — APPOINTMENT (OUTPATIENT)
Dept: PSYCHIATRY | Facility: CLINIC | Age: 63
End: 2023-02-02
Payer: COMMERCIAL

## 2023-02-02 PROCEDURE — 90837 PSYTX W PT 60 MINUTES: CPT | Mod: 95

## 2023-02-02 NOTE — PLAN
[FreeTextEntry2] : 1) Reduction in severity of symptoms demonstrated by 20% decline on self-report measures\par \par 2) Demonstrate understanding of relationship between trauma history and thoughts and beliefs contributing to anxiety symptoms\par \par 3) Engage in cognitive restructuring of beliefs and emotional processing and meaning-making around history of trauma.  [de-identified] : Session focused on treatment plan and assessing distress. Client reported continued distress related to themes of invalidation related to past trauma and interactions with siblings related to her mother’s passing.  She identified thoughts and feelings connected to these themes and impact it has on her relationships and self-worth. Due to this theme along with power/control, writer and client discussed internal versus external validation and what ways of thinking (e.g., stuck points) that should be processed and challenged. Client appeared to gain insight and agreed to create a list of stuck points to be reviewed at next session.   [FreeTextEntry1] : Continue with treatment plan weekly.

## 2023-02-07 ENCOUNTER — APPOINTMENT (OUTPATIENT)
Dept: PSYCHIATRY | Facility: CLINIC | Age: 63
End: 2023-02-07
Payer: COMMERCIAL

## 2023-02-07 PROCEDURE — 90837 PSYTX W PT 60 MINUTES: CPT | Mod: 95

## 2023-02-07 NOTE — PLAN
[Trauma Focused CBT] : Trauma Focused CBT [Recommended Frequency of Visits: ____] : Recommended frequency of visits: [unfilled] [Return in ____ week(s)] : Return in [unfilled] week(s) [FreeTextEntry2] : 1) Reduction in severity of symptoms demonstrated by 20% decline on self-report measures\par \par 2) Demonstrate understanding of relationship between trauma history and thoughts and beliefs contributing to anxiety symptoms\par \par 3) Engage in cognitive restructuring of beliefs and emotional processing and meaning-making around history of trauma.  [de-identified] : Session focused on treatment plan and assessing distress. Client reported continued distress related to themes of invalidation related to past trauma and siblings related to her mother’s passing.  Client and writer discussed the assignment in session, with an emphasis on identifying stuck points in thinking that interfere with recovery discussed stuck points given her past trauma. Connection between theme related to self-worth and power/control were explored. Validation and support were provided. Client appeared to gain insight and agreed to add to a list of stuck points to be reviewed at next session.   [FreeTextEntry1] : Continue with treatment plan weekly but next week will be missed given her schedule.

## 2023-02-07 NOTE — REASON FOR VISIT
[Patient preference] : as per patient preference [Other Location: e.g. Home (Enter Location, City,State)___] : The provider was located at [unfilled]. [Home] : The patient, [unfilled], was located at home, [unfilled], at the time of the visit. [Verbal consent obtained from patient/other participant(s)] : Verbal consent for telehealth/telephonic services obtained from patient/other participant(s) [FreeTextEntry4] : 9:05` [FreeTextEntry5] : 10:00

## 2023-02-09 ENCOUNTER — APPOINTMENT (OUTPATIENT)
Dept: PSYCHIATRY | Facility: CLINIC | Age: 63
End: 2023-02-09
Payer: COMMERCIAL

## 2023-02-22 ENCOUNTER — APPOINTMENT (OUTPATIENT)
Dept: PSYCHIATRY | Facility: CLINIC | Age: 63
End: 2023-02-22
Payer: COMMERCIAL

## 2023-02-22 PROCEDURE — 90837 PSYTX W PT 60 MINUTES: CPT | Mod: 95

## 2023-02-22 NOTE — REASON FOR VISIT
[Patient preference] : as per patient preference [Other Location: e.g. Home (Enter Location, City,State)___] : The provider was located at [unfilled]. [Home] : The patient, [unfilled], was located at home, [unfilled], at the time of the visit. [Verbal consent obtained from patient/other participant(s)] : Verbal consent for telehealth/telephonic services obtained from patient/other participant(s) [FreeTextEntry4] : 1:05 [FreeTextEntry5] : 2:00

## 2023-02-22 NOTE — PLAN
[Trauma Focused CBT] : Trauma Focused CBT [Recommended Frequency of Visits: ____] : Recommended frequency of visits: [unfilled] [FreeTextEntry2] : 1) Reduction in severity of symptoms demonstrated by 20% decline on self-report measures\par \par 2) Demonstrate understanding of relationship between trauma history and thoughts and beliefs contributing to anxiety symptoms\par \par 3) Engage in cognitive restructuring of beliefs and emotional processing and meaning-making around history of trauma.  [de-identified] : Session focused on treatment plan and assessing distress. Client reported continued distress related to themes of invalidation related to past trauma and work related stressors. Client and writer discussed the ABC assignment in session and associated stuck points that interfere with recovery. Theme related to self-worth and power/control were explored along with alterative statements. Positive feedback was provided for her alternatives. Client appeared to gain insight and agreed to add to a list of stuck points and practice bringing awareness to stuck points and questions on ABC worksheet. [Return in ____ week(s)] : Return in [unfilled] week(s) [FreeTextEntry1] : Continue with treatment plan weekly.

## 2023-03-02 ENCOUNTER — APPOINTMENT (OUTPATIENT)
Dept: PSYCHIATRY | Facility: CLINIC | Age: 63
End: 2023-03-02
Payer: COMMERCIAL

## 2023-03-02 PROCEDURE — 90837 PSYTX W PT 60 MINUTES: CPT | Mod: 95

## 2023-03-02 NOTE — RISK ASSESSMENT
[No, patient denies ideation or behavior] : No, patient denies ideation or behavior [FreeTextEntry8] : No indication of safety concerns

## 2023-03-02 NOTE — PLAN
[Trauma Focused CBT] : Trauma Focused CBT [Recommended Frequency of Visits: ____] : Recommended frequency of visits: [unfilled] [Return in ____ week(s)] : Return in [unfilled] week(s) [FreeTextEntry2] : 1) Reduction in severity of symptoms demonstrated by 20% decline on self-report measures\par \par 2) Demonstrate understanding of relationship between trauma history and thoughts and beliefs contributing to anxiety symptoms\par \par 3) Engage in cognitive restructuring of beliefs and emotional processing and meaning-making around history of trauma.  [de-identified] : Session focused on treatment plan and assessing distress. Client reported continued distress related to themes of invalidation and self-esteem related to past trauma and work-related stressors. Client and writer discussed the ABC worksheets in session. Client was able to understand the connection between thoughts, feelings and behaviors. Cognitions about self-blame/guilt were specifically targeted for cognitive restructuring. In addition, “challenging questions” worksheet was introduced to the client to aid her own challenge of stuck points. The notion of stuck points and association with self-esteem was reviewed, and the client agreed to practice the worksheets over the week. Client appeared to gain insight. Couples therapy was discussed, and client noted she had a list referrals. She will consider reaching out to manage some communication issues in her current relationship with her  and their business.  [FreeTextEntry1] : Continue with treatment plan weekly.

## 2023-03-02 NOTE — REASON FOR VISIT
[Patient preference] : as per patient preference [Other Location: e.g. Home (Enter Location, City,State)___] : The provider was located at [unfilled]. [Home] : The patient, [unfilled], was located at home, [unfilled], at the time of the visit. [Verbal consent obtained from patient/other participant(s)] : Verbal consent for telehealth/telephonic services obtained from patient/other participant(s) [FreeTextEntry4] : 9:00 [FreeTextEntry5] : 9:55 [FreeTextEntry1] : Continue with treatment plan.

## 2023-03-09 ENCOUNTER — APPOINTMENT (OUTPATIENT)
Dept: PSYCHIATRY | Facility: CLINIC | Age: 63
End: 2023-03-09
Payer: COMMERCIAL

## 2023-03-09 PROCEDURE — 90837 PSYTX W PT 60 MINUTES: CPT | Mod: 95

## 2023-03-09 NOTE — RISK ASSESSMENT
[No, patient denies ideation or behavior] : No, patient denies ideation or behavior [No] : No [FreeTextEntry8] : No indication of safety concerns

## 2023-03-09 NOTE — PLAN
[Trauma Focused CBT] : Trauma Focused CBT [Recommended Frequency of Visits: ____] : Recommended frequency of visits: [unfilled] [FreeTextEntry2] : 1) Reduction in severity of symptoms demonstrated by 20% decline on self-report measures\par \par 2) Demonstrate understanding of relationship between trauma history and thoughts and beliefs contributing to anxiety symptoms\par \par 3) Engage in cognitive restructuring of beliefs and emotional processing and meaning-making around history of trauma.  [de-identified] : Session focused on assessing symptoms and treatment plan. Client reported increase in stress, feeling down with little interest in doing things, feeling badly about herself and poor concentration. Client also completed the PCL-5 to review symptoms of PTSD (PCL-5= 10 which indicated some symptoms of PTSD) and included negative beliefs about herself and others, distance from others, irritable behavior and jumpy. Client and writer discussed how her overall PTSD has significantly decreased given progress in treatment with Dr. Vazquez.  \par In session, client discussed issues related to communication with her . Client processed thoughts and feelings related to her birthday and communication with her . Client discussed barriers to talking to her  about couples therapy. Stuck points were identified and socratic questions used to assist with alterative thoughts to improve motivation to talk to her  about couples therapy. Client appeared to gain insight.\par  [Return in ____ week(s)] : Return in [unfilled] week(s) [FreeTextEntry1] : Continue with treatment plan weekly. will be meet in 2 weeks given client will be away.

## 2023-03-09 NOTE — REASON FOR VISIT
[Patient preference] : as per patient preference [Other Location: e.g. Home (Enter Location, City,State)___] : The provider was located at [unfilled]. [Home] : The patient, [unfilled], was located at home, [unfilled], at the time of the visit. [Verbal consent obtained from patient/other participant(s)] : Verbal consent for telehealth/telephonic services obtained from patient/other participant(s) [FreeTextEntry4] : 9:05 [FreeTextEntry5] : 10:00 [FreeTextEntry1] : Continue with treatment plan.

## 2023-03-22 ENCOUNTER — APPOINTMENT (OUTPATIENT)
Dept: PSYCHIATRY | Facility: CLINIC | Age: 63
End: 2023-03-22
Payer: COMMERCIAL

## 2023-03-22 PROCEDURE — 90837 PSYTX W PT 60 MINUTES: CPT | Mod: 95

## 2023-03-22 NOTE — PLAN
[Trauma Focused CBT] : Trauma Focused CBT [Recommended Frequency of Visits: ____] : Recommended frequency of visits: [unfilled] [Return in ____ week(s)] : Return in [unfilled] week(s) [FreeTextEntry2] : 1) Reduction in severity of symptoms demonstrated by 20% decline on self-report measures\par \par 2) Demonstrate understanding of relationship between trauma history and thoughts and beliefs contributing to anxiety symptoms\par \par 3) Engage in cognitive restructuring of beliefs and emotional processing and meaning-making around history of trauma.  [de-identified] : Session focused on treatment plan. Client and writer discussed previous weeks and consideration for couple’s therapy. Client noted she and her  will likely begin couples coaching which will likely help improve communication. In session, “challenging questions” worksheet were reviewed to assist the client to aid in her own challenge of stuck points. Client appeared to gain insight and perspective. She reported the worksheet was helpful and agreed to practice over the week. Since the client is out of the state next week, a follow up appointment was scheduled in two weeks. \par  [FreeTextEntry1] : Continue with treatment plan weekly. will be meet in 2 weeks given client will be away.

## 2023-04-06 ENCOUNTER — APPOINTMENT (OUTPATIENT)
Dept: PSYCHIATRY | Facility: CLINIC | Age: 63
End: 2023-04-06
Payer: COMMERCIAL

## 2023-04-06 PROCEDURE — 90837 PSYTX W PT 60 MINUTES: CPT | Mod: 95

## 2023-04-06 NOTE — PLAN
[FreeTextEntry2] : 1) Reduction in severity of symptoms demonstrated by 20% decline on self-report measures\par \par 2) Demonstrate understanding of relationship between trauma history and thoughts and beliefs contributing to anxiety symptoms\par \par 3) Engage in cognitive restructuring of beliefs and emotional processing and meaning-making around history of trauma.  [Trauma Focused CBT] : Trauma Focused CBT [de-identified] : \par Session focused on treatment plan. Client and writer discussed previous weeks and homework she was working on regarding stuck points. Session focused on themes related to power and control in relationship with current partner at work and reminders of past trauma. Socratic questions were used in session to help gain perspective and alterative beliefs were identified to help reduce distress. Positive feedback was provided.\par \par  [Recommended Frequency of Visits: ____] : Recommended frequency of visits: [unfilled] [Return in ____ week(s)] : Return in [unfilled] week(s) [FreeTextEntry1] : Continue with treatment plan weekly. will be meet in 2 weeks given client will be away.

## 2023-04-06 NOTE — REASON FOR VISIT
[Patient preference] : as per patient preference [Other Location: e.g. Home (Enter Location, City,State)___] : The provider was located at [unfilled]. [Home] : The patient, [unfilled], was located at home, [unfilled], at the time of the visit. [Verbal consent obtained from patient/other participant(s)] : Verbal consent for telehealth/telephonic services obtained from patient/other participant(s) [FreeTextEntry4] : 11:05 [FreeTextEntry5] : 11:00 [FreeTextEntry1] : Continue with treatment plan.

## 2023-04-06 NOTE — END OF VISIT
[Duration of Psychotherapy Visit (minutes spent in synchronous communication): ____] : Duration of Psychotherapy Visit (minutes spent in synchronous communication): [unfilled] [Individual Psychotherapy for 53+ minutes] : Individual Psychotherapy for 53+ minutes  no difficulties

## 2023-04-13 ENCOUNTER — APPOINTMENT (OUTPATIENT)
Dept: PSYCHIATRY | Facility: CLINIC | Age: 63
End: 2023-04-13
Payer: COMMERCIAL

## 2023-04-13 PROCEDURE — 90837 PSYTX W PT 60 MINUTES: CPT | Mod: 95

## 2023-04-13 NOTE — PLAN
[FreeTextEntry2] : 1) Reduction in severity of symptoms demonstrated by 20% decline on self-report measures\par \par 2) Demonstrate understanding of relationship between trauma history and thoughts and beliefs contributing to anxiety symptoms\par \par 3) Engage in cognitive restructuring of beliefs and emotional processing and meaning-making around history of trauma.  [Trauma Focused CBT] : Trauma Focused CBT [de-identified] : Session focused on treatment plan. Client and writer discussed previous weeks and related stuck points connected to power and control in current relationships that remind her of her past trauma.. Socratic questions were used in session to help gain perspective and alterative beliefs. She was able to provide examples of giving and taking power and the impact in relationships. Validation was provided. Client agreed to bring awareness to stuck points connected to power and control and practice alterative beliefs that can help her feel more empowered.  \par  [Recommended Frequency of Visits: ____] : Recommended frequency of visits: [unfilled] [Return in ____ week(s)] : Return in [unfilled] week(s) [FreeTextEntry1] : Continue with treatment plan weekly. will be meet in 2 weeks given client will be away.

## 2023-05-02 ENCOUNTER — APPOINTMENT (OUTPATIENT)
Dept: PSYCHIATRY | Facility: CLINIC | Age: 63
End: 2023-05-02
Payer: COMMERCIAL

## 2023-05-02 PROCEDURE — 90837 PSYTX W PT 60 MINUTES: CPT | Mod: 95

## 2023-05-02 NOTE — PLAN
[FreeTextEntry2] : 1) Reduction in severity of symptoms demonstrated by 20% decline on self-report measures\par \par 2) Demonstrate understanding of relationship between trauma history and thoughts and beliefs contributing to anxiety symptoms\par \par 3) Engage in cognitive restructuring of beliefs and emotional processing and meaning-making around history of trauma.  [Trauma Focused CBT] : Trauma Focused CBT [de-identified] : Session focused on treatment plan. Client and writer discussed previous weeks and related stuck points connected to power and control in current relationships that reminds her of her past trauma. Socratic questions were used in session to help gain perspective and alterative beliefs. Writer and client discussed communication and boundary setting. She was able to provide examples of setting boundaries and the impact. Client agreed to bring awareness to stuck points connected to power and control and practice alterative beliefs.  [Recommended Frequency of Visits: ____] : Recommended frequency of visits: [unfilled] [Return in ____ week(s)] : Return in [unfilled] week(s) [FreeTextEntry1] : Continue with treatment plan weekly. will be meet in 2 weeks given client will be away.

## 2023-05-02 NOTE — REASON FOR VISIT
[Patient preference] : as per patient preference [Other Location: e.g. Home (Enter Location, City,State)___] : The provider was located at [unfilled]. [Home] : The patient, [unfilled], was located at home, [unfilled], at the time of the visit. [Verbal consent obtained from patient/other participant(s)] : Verbal consent for telehealth/telephonic services obtained from patient/other participant(s) [FreeTextEntry4] : 11:05 [FreeTextEntry5] : 12:00 [FreeTextEntry1] : Continue with treatment plan.

## 2023-05-17 ENCOUNTER — APPOINTMENT (OUTPATIENT)
Dept: PSYCHIATRY | Facility: CLINIC | Age: 63
End: 2023-05-17
Payer: COMMERCIAL

## 2023-05-17 PROCEDURE — 90837 PSYTX W PT 60 MINUTES: CPT | Mod: 95
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
## 2023-05-17 NOTE — REASON FOR VISIT
[Patient preference] : as per patient preference [Other Location: e.g. Home (Enter Location, City,State)___] : The provider was located at [unfilled]. [Home] : The patient, [unfilled], was located at home, [unfilled], at the time of the visit. [Verbal consent obtained from patient/other participant(s)] : Verbal consent for telehealth/telephonic services obtained from patient/other participant(s) [FreeTextEntry4] : 10:05 [FreeTextEntry5] : 11:00 [FreeTextEntry1] : Continue with treatment plan.

## 2023-05-23 ENCOUNTER — APPOINTMENT (OUTPATIENT)
Dept: PSYCHIATRY | Facility: CLINIC | Age: 63
End: 2023-05-23
Payer: COMMERCIAL

## 2023-05-23 PROCEDURE — 90837 PSYTX W PT 60 MINUTES: CPT | Mod: 95

## 2023-05-23 NOTE — REASON FOR VISIT
[Patient preference] : as per patient preference [Other Location: e.g. Home (Enter Location, City,State)___] : The provider was located at [unfilled]. [Home] : The patient, [unfilled], was located at home, [unfilled], at the time of the visit. [Verbal consent obtained from patient/other participant(s)] : Verbal consent for telehealth/telephonic services obtained from patient/other participant(s) [FreeTextEntry4] : 11:08 [FreeTextEntry5] : 12:01 [FreeTextEntry1] : Continue with treatment plan.
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
## 2023-05-25 ENCOUNTER — APPOINTMENT (OUTPATIENT)
Dept: OBGYN | Facility: CLINIC | Age: 63
End: 2023-05-25
Payer: COMMERCIAL

## 2023-05-25 PROCEDURE — 99205 OFFICE O/P NEW HI 60 MIN: CPT

## 2023-05-26 NOTE — HISTORY OF PRESENT ILLNESS
[FreeTextEntry1] : HPI: 64yo female presents to discuss and continue HRT.\par Pt is currently taking Estradiol 0.5mg and 1mg alternating each day, Prometrium 100mg daily, and Estring vaginal ring.\par Per pt, she is now approaching 15yrs postmenopause and has been taking HRT since menopausal transition. Pt notes h/o severe vasomotor symptoms, mood changes, and insomnia.\par Pt still has some mild vasomotor symptoms on current regimen and has notes this is the lowest dose HRT she has been able to tolerate.\par Pt now switching physicians due to insurance reasons.\par Denies any PMB or breast issues. Denies any breast CA in her family. Mother with cervical CA and father with colon CA\par Denies any medical problems or taking any other medications.\par \par Plan: \par Discussed risks of continued HRT as she continues to become increasingly removed from menopause as she ages - increased risks of DVT, PE, Stroke, heart attacks, cancer, death.\par Reviewed the WHO trial, risks comparing estrogen only vs combination HRT. Effects of hysterectomy, pt has not had a hysterectomy.\par Patient acknowledges risks and accepting of the risks. \par HRT refilled after extensive counseling. \par All questions answered.\par RTO for annual exam\par Over 60min spent \par YASMIN Chirinos MD \par

## 2023-06-09 ENCOUNTER — APPOINTMENT (OUTPATIENT)
Dept: PSYCHIATRY | Facility: CLINIC | Age: 63
End: 2023-06-09
Payer: COMMERCIAL

## 2023-06-09 PROCEDURE — 90837 PSYTX W PT 60 MINUTES: CPT | Mod: 95

## 2023-06-09 NOTE — PLAN
[FreeTextEntry2] : 1) Reduction in severity of symptoms demonstrated by 20% decline on self-report measures\par \par 2) Demonstrate understanding of relationship between trauma history and thoughts and beliefs contributing to anxiety symptoms\par \par 3) Engage in cognitive restructuring of beliefs and emotional processing and meaning-making around history of trauma.  [Trauma Focused CBT] : Trauma Focused CBT [de-identified] : Session focused on treatment plan. Client and writer discussed previous weeks and recent connection with an advisor and new information she learned about his communication style. In addition, she discussed ways her perspective about herself has changed which has contributed to positive self-esteem. Positive feedback was provided. Themes related to trauma were reviewed. Client appeared to gain insight.  [Recommended Frequency of Visits: ____] : Recommended frequency of visits: [unfilled] [FreeTextEntry1] : Continue with treatment plan weekly but next appointment is scheduled for 2 weeks given she will be away.

## 2023-06-09 NOTE — REASON FOR VISIT
[Patient preference] : as per patient preference [Other Location: e.g. Home (Enter Location, City,State)___] : The provider was located at [unfilled]. [Home] : The patient, [unfilled], was located at home, [unfilled], at the time of the visit. [Verbal consent obtained from patient/other participant(s)] : Verbal consent for telehealth/telephonic services obtained from patient/other participant(s) [FreeTextEntry4] : 3:05 [FreeTextEntry5] : 4:00 [FreeTextEntry1] : Continue with treatment plan.

## 2023-06-16 ENCOUNTER — APPOINTMENT (OUTPATIENT)
Dept: PSYCHIATRY | Facility: CLINIC | Age: 63
End: 2023-06-16
Payer: COMMERCIAL

## 2023-06-16 PROCEDURE — 90837 PSYTX W PT 60 MINUTES: CPT | Mod: 95

## 2023-06-16 NOTE — REASON FOR VISIT
[Patient preference] : as per patient preference [Other Location: e.g. Home (Enter Location, City,State)___] : The provider was located at [unfilled]. [Home] : The patient, [unfilled], was located at home, [unfilled], at the time of the visit. [Verbal consent obtained from patient/other participant(s)] : Verbal consent for telehealth/telephonic services obtained from patient/other participant(s) [FreeTextEntry4] : 1:00 [FreeTextEntry5] : 1:55 [FreeTextEntry1] : Continue with treatment plan.
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
## 2023-06-27 ENCOUNTER — APPOINTMENT (OUTPATIENT)
Dept: PSYCHIATRY | Facility: CLINIC | Age: 63
End: 2023-06-27
Payer: COMMERCIAL

## 2023-06-27 PROCEDURE — 90837 PSYTX W PT 60 MINUTES: CPT | Mod: 95

## 2023-06-27 NOTE — REASON FOR VISIT
[Patient preference] : as per patient preference [Other Location: e.g. Home (Enter Location, City,State)___] : The provider was located at [unfilled]. [Home] : The patient, [unfilled], was located at home, [unfilled], at the time of the visit. [Verbal consent obtained from patient/other participant(s)] : Verbal consent for telehealth/telephonic services obtained from patient/other participant(s) [FreeTextEntry4] : 2:00 [FreeTextEntry5] : 2:55 [FreeTextEntry1] : Continue with treatment plan.
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
## 2023-08-11 ENCOUNTER — APPOINTMENT (OUTPATIENT)
Dept: PSYCHIATRY | Facility: CLINIC | Age: 63
End: 2023-08-11
Payer: COMMERCIAL

## 2023-08-11 PROCEDURE — 90834 PSYTX W PT 45 MINUTES: CPT | Mod: 95

## 2023-08-11 NOTE — REASON FOR VISIT
[Patient preference] : as per patient preference [Other Location: e.g. Home (Enter Location, City,State)___] : The patient, [unfilled], was located at [unfilled] at the time of the visit. [Verbal consent obtained from patient/other participant(s)] : Verbal consent for telehealth/telephonic services obtained from patient/other participant(s) [FreeTextEntry4] : 2:00 [FreeTextEntry5] : 2:40 [FreeTextEntry1] : Continue with treatment plan.

## 2023-08-24 ENCOUNTER — APPOINTMENT (OUTPATIENT)
Dept: PSYCHIATRY | Facility: CLINIC | Age: 63
End: 2023-08-24
Payer: COMMERCIAL

## 2023-08-24 PROCEDURE — 90837 PSYTX W PT 60 MINUTES: CPT | Mod: 95

## 2023-08-24 NOTE — PLAN
[FreeTextEntry2] : 1) Reduction in severity of symptoms demonstrated by 20% decline on self-report measures\par  \par  2) Demonstrate understanding of relationship between trauma history and thoughts and beliefs contributing to anxiety symptoms\par  \par  3) Engage in cognitive restructuring of beliefs and emotional processing and meaning-making around history of trauma.  [Trauma Focused CBT] : Trauma Focused CBT [de-identified] : Session focused on treatment plan. Client and writer discussed previous weeks and recent news related to her past trauma. Client processed thoughts and feelings given recently news significant validated her experiences and helped reduce self-doubt. Validation and support were provided. Psychoeducation was provided regarding primary and secondary emotions along with posttraumatic growth. 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    [Recommended Frequency of Visits: ____] : Recommended frequency of visits: [unfilled] [Return in ____ week(s)] : Return in [unfilled] week(s) [FreeTextEntry1] : Continue with treatment plan.

## 2023-08-24 NOTE — REASON FOR VISIT
[Patient preference] : as per patient preference [Other Location: e.g. Home (Enter Location, City,State)___] : The patient, [unfilled], was located at [unfilled] at the time of the visit. [Verbal consent obtained from patient/other participant(s)] : Verbal consent for telehealth/telephonic services obtained from patient/other participant(s) [FreeTextEntry4] : 12:05 [FreeTextEntry5] : 1:00 [FreeTextEntry1] : Continue with treatment plan.

## 2023-09-07 ENCOUNTER — APPOINTMENT (OUTPATIENT)
Dept: PSYCHIATRY | Facility: CLINIC | Age: 63
End: 2023-09-07
Payer: COMMERCIAL

## 2023-09-07 PROCEDURE — 90837 PSYTX W PT 60 MINUTES: CPT | Mod: 95

## 2023-09-07 NOTE — REASON FOR VISIT
[Patient preference] : as per patient preference [Other Location: e.g. Home (Enter Location, City,State)___] : The patient, [unfilled], was located at [unfilled] at the time of the visit. [Verbal consent obtained from patient/other participant(s)] : Verbal consent for telehealth/telephonic services obtained from patient/other participant(s) [FreeTextEntry4] : 10:10 [FreeTextEntry5] : 11:05 [FreeTextEntry1] : Continue with treatment plan.

## 2023-09-07 NOTE — PLAN
[Trauma Focused CBT] : Trauma Focused CBT [Recommended Frequency of Visits: ____] : Recommended frequency of visits: [unfilled] [Return in ____ week(s)] : Return in [unfilled] week(s) [FreeTextEntry2] : 1) Reduction in severity of symptoms demonstrated by 20% decline on self-report measures\par  \par  2) Demonstrate understanding of relationship between trauma history and thoughts and beliefs contributing to anxiety symptoms\par  \par  3) Engage in cognitive restructuring of beliefs and emotional processing and meaning-making around history of trauma.  [de-identified] : Session focused on assessing symptoms and treatment plan. Client reported increase in positive self-esteem and validation. Client and writer processed recent news related to her past trauma and interaction with a co-worker as it related to trust. Themes of trust and fears were discussed as being multidimensional and on a continuum. Posttraumatic growth was evident as she was able to work through some interactions with easy and confidence which she noted was a big change. 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    [FreeTextEntry1] : Continue with treatment plan.

## 2023-09-20 ENCOUNTER — APPOINTMENT (OUTPATIENT)
Dept: PSYCHIATRY | Facility: CLINIC | Age: 63
End: 2023-09-20
Payer: COMMERCIAL

## 2023-09-20 PROCEDURE — 90837 PSYTX W PT 60 MINUTES: CPT | Mod: 95

## 2023-10-02 ENCOUNTER — APPOINTMENT (OUTPATIENT)
Dept: PSYCHIATRY | Facility: CLINIC | Age: 63
End: 2023-10-02
Payer: COMMERCIAL

## 2023-10-02 PROCEDURE — 90837 PSYTX W PT 60 MINUTES: CPT | Mod: GT

## 2023-10-05 DIAGNOSIS — N95.1 MENOPAUSAL AND FEMALE CLIMACTERIC STATES: ICD-10-CM

## 2023-10-19 ENCOUNTER — APPOINTMENT (OUTPATIENT)
Dept: PSYCHIATRY | Facility: CLINIC | Age: 63
End: 2023-10-19
Payer: COMMERCIAL

## 2023-10-19 DIAGNOSIS — F43.10 POST-TRAUMATIC STRESS DISORDER, UNSPECIFIED: ICD-10-CM

## 2023-10-19 PROCEDURE — 90837 PSYTX W PT 60 MINUTES: CPT | Mod: GT

## 2023-10-31 ENCOUNTER — APPOINTMENT (OUTPATIENT)
Dept: PSYCHIATRY | Facility: CLINIC | Age: 63
End: 2023-10-31
Payer: COMMERCIAL

## 2023-10-31 ENCOUNTER — APPOINTMENT (OUTPATIENT)
Dept: PSYCHIATRY | Facility: CLINIC | Age: 63
End: 2023-10-31

## 2023-10-31 PROCEDURE — 90839 PSYTX CRISIS INITIAL 60 MIN: CPT | Mod: GT

## 2023-11-13 ENCOUNTER — APPOINTMENT (OUTPATIENT)
Dept: PSYCHIATRY | Facility: CLINIC | Age: 63
End: 2023-11-13
Payer: COMMERCIAL

## 2023-11-13 PROCEDURE — 90837 PSYTX W PT 60 MINUTES: CPT | Mod: GT

## 2023-11-27 ENCOUNTER — APPOINTMENT (OUTPATIENT)
Dept: PSYCHIATRY | Facility: CLINIC | Age: 63
End: 2023-11-27
Payer: COMMERCIAL

## 2023-11-27 PROCEDURE — 90837 PSYTX W PT 60 MINUTES: CPT | Mod: GT

## 2023-12-14 ENCOUNTER — APPOINTMENT (OUTPATIENT)
Dept: PSYCHIATRY | Facility: CLINIC | Age: 63
End: 2023-12-14
Payer: COMMERCIAL

## 2023-12-14 PROCEDURE — 90837 PSYTX W PT 60 MINUTES: CPT | Mod: GT

## 2023-12-14 NOTE — PLAN
[Cognitive and/or Behavior Therapy] : Cognitive and/or Behavior Therapy  [Recommended Frequency of Visits: ____] : Recommended frequency of visits: [unfilled] [Return in ____ week(s)] : Return in [unfilled] week(s) [FreeTextEntry2] : 1) Reduction in severity of symptoms demonstrated by 20% decline on self-report measures\par  \par  2) Demonstrate understanding of relationship between trauma history and thoughts and beliefs contributing to anxiety symptoms\par  \par  3) Engage in cognitive restructuring of beliefs and emotional processing and meaning-making around history of trauma.  [de-identified] : Session focused on assessing symptoms and treatment plan. Client reported she continued to experience positive mood with improvement in self-esteem. Client scored a 6 on PTSD Check List-5 which indicates a low score on symptoms of Posttraumatic Stress Disorder.  Progress due to therapy and other live events were discussed. Client express gratitude for her ability to make change in her mental health. Client and writer processed thoughts and feelings related to interactions with her  and employee. Themes related to power/control and esteem were explored as it relates to progress in treatment.  Positive feedback was provided. She appeared to gain insight and will continue to practice skills from sessions. [FreeTextEntry1] : Continue with treatment plan.

## 2024-01-17 ENCOUNTER — APPOINTMENT (OUTPATIENT)
Dept: PSYCHIATRY | Facility: CLINIC | Age: 64
End: 2024-01-17

## 2024-01-25 ENCOUNTER — APPOINTMENT (OUTPATIENT)
Dept: PSYCHIATRY | Facility: CLINIC | Age: 64
End: 2024-01-25
Payer: COMMERCIAL

## 2024-01-25 PROCEDURE — 90837 PSYTX W PT 60 MINUTES: CPT | Mod: 95

## 2024-02-08 RX ORDER — ESTRADIOL 10 UG/1
10 TABLET VAGINAL
Qty: 30 | Refills: 3 | Status: ACTIVE | COMMUNITY
Start: 2023-10-24 | End: 1900-01-01

## 2024-02-22 ENCOUNTER — APPOINTMENT (OUTPATIENT)
Dept: PSYCHIATRY | Facility: CLINIC | Age: 64
End: 2024-02-22
Payer: COMMERCIAL

## 2024-02-22 PROCEDURE — 90837 PSYTX W PT 60 MINUTES: CPT | Mod: 95

## 2024-04-10 NOTE — PLAN
[Cognitive and/or Behavior Therapy] : Cognitive and/or Behavior Therapy  [Recommended Frequency of Visits: ____] : Recommended frequency of visits: [unfilled] [Return in ____ week(s)] : Return in [unfilled] week(s) [FreeTextEntry2] : 1) Reduction in severity of symptoms demonstrated by 20% decline on self-report measures\par  \par  2) Demonstrate understanding of relationship between trauma history and thoughts and beliefs contributing to anxiety symptoms\par  \par  3) Engage in cognitive restructuring of beliefs and emotional processing and meaning-making around history of trauma.  [de-identified] : Session focused on assessing symptoms and treatment plan. Client reported she continued to experience positive mood, feeling relaxed, being less overwhelmed with improvement in self-esteem and ability to manage emotions. Progress was reviewed along with examples of how she has practiced what was learned in session were discussed. Positive feedback were provided. Themes related to power/control and esteem were explored. Acceptance skills were reviewed. Given progress in sessions and writer's schedule, discussion about future termination in a few months were discussed.  [FreeTextEntry1] : Continue with treatment plan.

## 2024-04-15 ENCOUNTER — APPOINTMENT (OUTPATIENT)
Dept: PSYCHIATRY | Facility: CLINIC | Age: 64
End: 2024-04-15
Payer: COMMERCIAL

## 2024-04-15 PROCEDURE — 90837 PSYTX W PT 60 MINUTES: CPT | Mod: 95

## 2024-04-15 NOTE — RISK ASSESSMENT
[No, patient denies ideation or behavior] : No, patient denies ideation or behavior [No] : No [FreeTextEntry8] : No indication of safety concerns Alert-The patient is alert, awake and responds to voice. The patient is oriented to time, place, and person. The triage nurse is able to obtain subjective information.

## 2024-04-15 NOTE — PLAN
[FreeTextEntry2] : 1) Reduction in severity of symptoms demonstrated by 20% decline on self-report measures\par  \par  2) Demonstrate understanding of relationship between trauma history and thoughts and beliefs contributing to anxiety symptoms\par  \par  3) Engage in cognitive restructuring of beliefs and emotional processing and meaning-making around history of trauma.  [Cognitive and/or Behavior Therapy] : Cognitive and/or Behavior Therapy  [de-identified] : Session focused on assessing symptoms and treatment plan. Client reported she continued to experience positive mood, feeling relaxed with less stress and worry and negative self-talk. She also noted an increase in positive self-esteem and ability to manage current work or family related dynamics. Progress was reviewed along with examples of how she has practiced what was learned in session were discussed. Positive feedback were provided. Themes related to power/control and esteem were explored. Given progress in sessions and writer's schedule, discussion about future termination in a few months were discussed. [Recommended Frequency of Visits: ____] : Recommended frequency of visits: [unfilled] [Return in ____ week(s)] : Return in [unfilled] week(s) [FreeTextEntry1] : Continue with treatment plan.

## 2024-05-30 ENCOUNTER — APPOINTMENT (OUTPATIENT)
Dept: OBGYN | Facility: CLINIC | Age: 64
End: 2024-05-30
Payer: COMMERCIAL

## 2024-05-30 VITALS
BODY MASS INDEX: 20.83 KG/M2 | WEIGHT: 125 LBS | SYSTOLIC BLOOD PRESSURE: 120 MMHG | DIASTOLIC BLOOD PRESSURE: 60 MMHG | HEIGHT: 65 IN

## 2024-05-30 DIAGNOSIS — Z01.419 ENCOUNTER FOR GYNECOLOGICAL EXAMINATION (GENERAL) (ROUTINE) W/OUT ABNORMAL FINDINGS: ICD-10-CM

## 2024-05-30 DIAGNOSIS — Z12.39 ENCOUNTER FOR OTHER SCREENING FOR MALIGNANT NEOPLASM OF BREAST: ICD-10-CM

## 2024-05-30 PROCEDURE — 99396 PREV VISIT EST AGE 40-64: CPT

## 2024-05-30 RX ORDER — ESTRADIOL 2 MG/1
7.5 SYSTEM VAGINAL
Qty: 1 | Refills: 3 | Status: DISCONTINUED | COMMUNITY
Start: 2023-08-29 | End: 2024-05-30

## 2024-05-30 RX ORDER — PROGESTERONE 100 MG/1
100 CAPSULE ORAL
Qty: 90 | Refills: 1 | Status: DISCONTINUED | COMMUNITY
Start: 2023-05-25 | End: 2024-05-30

## 2024-05-30 RX ORDER — ESTRADIOL 0.5 MG/1
0.5 TABLET ORAL
Qty: 45 | Refills: 3 | Status: DISCONTINUED | COMMUNITY
Start: 2023-05-25 | End: 2024-05-30

## 2024-05-30 RX ORDER — ESTRADIOL 2 MG/1
2 RING VAGINAL
Qty: 1 | Refills: 2 | Status: DISCONTINUED | COMMUNITY
Start: 2023-05-25 | End: 2024-05-30

## 2024-05-30 RX ADMIN — ESTRADIOL/NORETHINDRONE ACETATE TRANSDERMAL SYSTEM 0 MG/DAY: .05; .14 PATCH, EXTENDED RELEASE TRANSDERMAL at 00:00

## 2024-05-30 NOTE — HISTORY OF PRESENT ILLNESS
[FreeTextEntry1] : HPI: Patient is a 65yo female presenting for her well woman exam. Patient is without complaints today.   ROS: neg unless specified in HPI   Gyn Hx: Menopause: No HRT,  No PMB No known fibroids, ovarian cysts, or other gynecologic problems Pap:  none in emr Breast: 2021 BIRADS 1   Gyn: Normal appearing external genitalia, normal appearing vagina and cervix, no CMT, bimanual with normal sized mobile uterus, no fixed adnexal masses or tenderness Normal atrophic external genitalia, atrophic vagina and cervix, no CMT bimanual with mobile uterus, no fixed adnexal masses or tenderness Bimanual limited by body habitus   Breast: No lymphadenopathy in the neck, chest wall, bilateral supraclavicular, infraclavicular, and bilateral axillary areas. No overt asymmetry in bilateral breast contour with normal appearing skin and normal appearing nipple areolar complex bilaterally. No nipple discharge expressed.   Plan: Gyn Screening: - Pap: cytology + automatic HR HPV sent - Menopause: on HRT, sometimes forgets to take progesterone pills, no PMB discussed US for surveillance of uterine lining in the setting of unopposed estrogen stimulation from HRT Also offered combipatch as an alternative. Combipatch rx'd and pt to stop current regimen   Breast Screening: - Discussed self-breast exam - Clinical breast exam performed - Mammogram for this year ordered   AHM: CV, Pulmonary, Endocrine, GI, Bone Screening, Vitamin supplementation, and Immunizations with PCP  YASMIN Chirinos MD

## 2024-06-20 ENCOUNTER — APPOINTMENT (OUTPATIENT)
Dept: PSYCHIATRY | Facility: CLINIC | Age: 64
End: 2024-06-20
Payer: COMMERCIAL

## 2024-06-20 DIAGNOSIS — F43.10 POST-TRAUMATIC STRESS DISORDER, UNSPECIFIED: ICD-10-CM

## 2024-06-20 PROCEDURE — 90837 PSYTX W PT 60 MINUTES: CPT | Mod: 95

## 2024-06-20 NOTE — REASON FOR VISIT
[Patient preference] : as per patient preference [Other Location: e.g. Home (Enter Location, City,State)___] : The provider was located at [unfilled]. [Home] : The patient, [unfilled], was located at home, [unfilled], at the time of the visit. [Verbal consent obtained from patient/other participant(s)] : Verbal consent for telehealth/telephonic services obtained from patient/other participant(s) [FreeTextEntry4] : 12:05 [FreeTextEntry5] : 1:00  [FreeTextEntry1] : Continue with treatment plan.

## 2024-06-20 NOTE — PLAN
[FreeTextEntry2] : 1) Reduction in severity of symptoms demonstrated by 20% decline on self-report measures\par  \par  2) Demonstrate understanding of relationship between trauma history and thoughts and beliefs contributing to anxiety symptoms\par  \par  3) Engage in cognitive restructuring of beliefs and emotional processing and meaning-making around history of trauma.  [Cognitive and/or Behavior Therapy] : Cognitive and/or Behavior Therapy  [de-identified] : Session focused on assessing symptoms and treatment plan. Client reported she continued to experience positive mood, feeling relaxed and less worry. However, she did notice intrusive thoughts related past trauma and blame. Client and writer reviewed themes associated with just word beliefs and power/control. Client appeared to gain insight. She processed thoughts and feelings related to current interaction with her  and ways to maintain boundaries. Progress in therapy was discussed and it was agreed upon that additional session will be helpful.  [Recommended Frequency of Visits: ____] : Recommended frequency of visits: [unfilled] [Return in ____ week(s)] : Return in [unfilled] week(s) [FreeTextEntry1] : Continue with treatment plan.

## 2024-06-24 LAB
CYTOLOGY CVX/VAG DOC THIN PREP: NORMAL
HPV HIGH+LOW RISK DNA PNL CVX: NOT DETECTED

## 2024-07-13 ENCOUNTER — TRANSCRIPTION ENCOUNTER (OUTPATIENT)
Age: 64
End: 2024-07-13

## 2024-08-30 ENCOUNTER — NON-APPOINTMENT (OUTPATIENT)
Age: 64
End: 2024-08-30

## 2024-09-12 NOTE — ASU PATIENT PROFILE, ADULT - TEACHING/LEARNING FACTORS IMPACT ABILITY TO LEARN
Pt wishes / needs the following. Please do referral & send MFM order for:    [ x ]  Genetic Screening - with limited OB US    DX:  Obesity    [  ]  medical consult    [  ]  16 wk cervical length    [ x ]  level 2 ultrasound    [   ] fetal echo    [ x ]  growth ultrasound at 32 wks    [  ]  serial ultrasound    [ x ]  NSTs once 36 weeks       none

## 2024-09-18 ENCOUNTER — APPOINTMENT (OUTPATIENT)
Dept: PSYCHIATRY | Facility: CLINIC | Age: 64
End: 2024-09-18
Payer: COMMERCIAL

## 2024-09-18 DIAGNOSIS — F43.10 POST-TRAUMATIC STRESS DISORDER, UNSPECIFIED: ICD-10-CM

## 2024-09-18 PROCEDURE — 90837 PSYTX W PT 60 MINUTES: CPT | Mod: 95

## 2024-09-18 NOTE — PLAN
[Cognitive and/or Behavior Therapy] : Cognitive and/or Behavior Therapy  [Recommended Frequency of Visits: ____] : Recommended frequency of visits: [unfilled] [Return in ____ week(s)] : Return in [unfilled] week(s) [FreeTextEntry2] : 1) Reduction in severity of symptoms demonstrated by 20% decline on self-report measures\par  \par  2) Demonstrate understanding of relationship between trauma history and thoughts and beliefs contributing to anxiety symptoms\par  \par  3) Engage in cognitive restructuring of beliefs and emotional processing and meaning-making around history of trauma.  [de-identified] : Session focused on assessing symptoms and treatment plan. Client reported she continued to experience positive mood and maintain gains from treatment; however she noted some startle responses and anger with a family member due to recent reminders of her past trauma. Client and writer identified stuck points related to recent interaction and socratic questions were used in session to challenge the thoughts. Client was able to begin to identify alterative, more helpful thoughts which and lead to a plan to open communication with her family member. Given recent stressor, writer and client agreed to meet in a month and client will identify additional helpful thoughts.   [FreeTextEntry1] : Continue with treatment plan.

## 2024-09-18 NOTE — REASON FOR VISIT
[Patient preference] : as per patient preference [Other Location: e.g. Home (Enter Location, City,State)___] : The provider was located at [unfilled]. [Home] : The patient, [unfilled], was located at home, [unfilled], at the time of the visit. [Verbal consent obtained from patient/other participant(s)] : Verbal consent for telehealth/telephonic services obtained from patient/other participant(s) [FreeTextEntry4] : 4:15 [FreeTextEntry5] : 5:10 [FreeTextEntry1] : Continue with treatment plan.

## 2024-09-18 NOTE — PLAN
[Cognitive and/or Behavior Therapy] : Cognitive and/or Behavior Therapy  [Recommended Frequency of Visits: ____] : Recommended frequency of visits: [unfilled] [Return in ____ week(s)] : Return in [unfilled] week(s) [FreeTextEntry2] : 1) Reduction in severity of symptoms demonstrated by 20% decline on self-report measures\par  \par  2) Demonstrate understanding of relationship between trauma history and thoughts and beliefs contributing to anxiety symptoms\par  \par  3) Engage in cognitive restructuring of beliefs and emotional processing and meaning-making around history of trauma.  [de-identified] : Session focused on assessing symptoms and treatment plan. Client reported she continued to experience positive mood and maintain gains from treatment; however she noted some startle responses and anger with a family member due to recent reminders of her past trauma. Client and writer identified stuck points related to recent interaction and socratic questions were used in session to challenge the thoughts. Client was able to begin to identify alterative, more helpful thoughts which and lead to a plan to open communication with her family member. Given recent stressor, writer and client agreed to meet in a month and client will identify additional helpful thoughts.   [FreeTextEntry1] : Continue with treatment plan.

## 2024-10-16 ENCOUNTER — APPOINTMENT (OUTPATIENT)
Dept: PSYCHIATRY | Facility: CLINIC | Age: 64
End: 2024-10-16
Payer: COMMERCIAL

## 2024-10-16 DIAGNOSIS — F43.10 POST-TRAUMATIC STRESS DISORDER, UNSPECIFIED: ICD-10-CM

## 2024-10-16 PROCEDURE — 90837 PSYTX W PT 60 MINUTES: CPT | Mod: 95

## 2024-12-02 ENCOUNTER — APPOINTMENT (OUTPATIENT)
Dept: MAMMOGRAPHY | Facility: CLINIC | Age: 64
End: 2024-12-02
Payer: COMMERCIAL

## 2024-12-02 ENCOUNTER — RESULT REVIEW (OUTPATIENT)
Age: 64
End: 2024-12-02

## 2024-12-02 ENCOUNTER — OUTPATIENT (OUTPATIENT)
Dept: OUTPATIENT SERVICES | Facility: HOSPITAL | Age: 64
LOS: 1 days | End: 2024-12-02
Payer: COMMERCIAL

## 2024-12-02 DIAGNOSIS — Z78.9 OTHER SPECIFIED HEALTH STATUS: Chronic | ICD-10-CM

## 2024-12-02 DIAGNOSIS — Z98.890 OTHER SPECIFIED POSTPROCEDURAL STATES: Chronic | ICD-10-CM

## 2024-12-02 DIAGNOSIS — S62.309B UNSPECIFIED FRACTURE OF UNSPECIFIED METACARPAL BONE, INITIAL ENCOUNTER FOR OPEN FRACTURE: Chronic | ICD-10-CM

## 2024-12-02 DIAGNOSIS — Z00.8 ENCOUNTER FOR OTHER GENERAL EXAMINATION: ICD-10-CM

## 2024-12-02 PROCEDURE — 77063 BREAST TOMOSYNTHESIS BI: CPT

## 2024-12-02 PROCEDURE — 77067 SCR MAMMO BI INCL CAD: CPT

## 2024-12-02 PROCEDURE — 77063 BREAST TOMOSYNTHESIS BI: CPT | Mod: 26

## 2024-12-02 PROCEDURE — 77067 SCR MAMMO BI INCL CAD: CPT | Mod: 26

## 2025-06-20 NOTE — ASU PATIENT PROFILE, ADULT - PREOP PAIN SCORE
ANNUAL LUNG SCREENING NOW OVERDUE    LAST LDCT (Low Dose Lung Screening CT) done 3/4/24    Patient still meets criteria for LDCT annual screening.   Last office visit 12/2/24    Annual LDCT order placed below, if you agree please cosign order.   The LDCT program will reach out to patient to schedule scan.   
1

## 2025-07-30 ENCOUNTER — NON-APPOINTMENT (OUTPATIENT)
Age: 65
End: 2025-07-30

## 2025-07-31 ENCOUNTER — APPOINTMENT (OUTPATIENT)
Dept: OBGYN | Facility: CLINIC | Age: 65
End: 2025-07-31
Payer: COMMERCIAL

## 2025-07-31 VITALS
WEIGHT: 125 LBS | HEIGHT: 65 IN | BODY MASS INDEX: 20.83 KG/M2 | DIASTOLIC BLOOD PRESSURE: 64 MMHG | SYSTOLIC BLOOD PRESSURE: 116 MMHG

## 2025-07-31 DIAGNOSIS — Z12.39 ENCOUNTER FOR OTHER SCREENING FOR MALIGNANT NEOPLASM OF BREAST: ICD-10-CM

## 2025-07-31 DIAGNOSIS — Z13.820 ENCOUNTER FOR SCREENING FOR OSTEOPOROSIS: ICD-10-CM

## 2025-07-31 DIAGNOSIS — Z01.419 ENCOUNTER FOR GYNECOLOGICAL EXAMINATION (GENERAL) (ROUTINE) W/OUT ABNORMAL FINDINGS: ICD-10-CM

## 2025-07-31 PROCEDURE — 99396 PREV VISIT EST AGE 40-64: CPT

## 2025-07-31 RX ORDER — ESTRADIOL 2 MG/1
7.5 SYSTEM VAGINAL
Qty: 1 | Refills: 1 | Status: ACTIVE | COMMUNITY
Start: 2025-07-31 | End: 1900-01-01

## 2025-07-31 RX ORDER — PROGESTERONE 100 MG/1
100 CAPSULE ORAL
Qty: 90 | Refills: 3 | Status: ACTIVE | COMMUNITY
Start: 2025-07-31 | End: 1900-01-01

## 2025-08-06 LAB
CYTOLOGY CVX/VAG DOC THIN PREP: NORMAL
HPV HIGH+LOW RISK DNA PNL CVX: NOT DETECTED